# Patient Record
Sex: FEMALE | Race: WHITE | NOT HISPANIC OR LATINO | Employment: OTHER | URBAN - METROPOLITAN AREA
[De-identification: names, ages, dates, MRNs, and addresses within clinical notes are randomized per-mention and may not be internally consistent; named-entity substitution may affect disease eponyms.]

---

## 2019-08-03 ENCOUNTER — LAB (OUTPATIENT)
Dept: LAB | Facility: HOSPITAL | Age: 84
End: 2019-08-03

## 2019-08-03 ENCOUNTER — TRANSCRIBE ORDERS (OUTPATIENT)
Dept: ADMINISTRATIVE | Facility: HOSPITAL | Age: 84
End: 2019-08-03

## 2019-08-03 DIAGNOSIS — I66.9 CEREBRAL ARTERY OCCLUSION: ICD-10-CM

## 2019-08-03 DIAGNOSIS — E78.81 LIPOID DERMATOARTHRITIS: ICD-10-CM

## 2019-08-03 DIAGNOSIS — E78.81 LIPOID DERMATOARTHRITIS: Primary | ICD-10-CM

## 2019-08-03 DIAGNOSIS — F34.1 DYSTHYMIC DISORDER: ICD-10-CM

## 2019-08-03 LAB
ALBUMIN SERPL-MCNC: 3.6 G/DL (ref 3.5–4.8)
ALBUMIN UR-MCNC: 7 MG/L
ALBUMIN/GLOB SERPL: 1.3 G/DL (ref 1–1.7)
ALP SERPL-CCNC: 51 U/L (ref 32–91)
ALT SERPL W P-5'-P-CCNC: 15 U/L (ref 14–54)
ANION GAP SERPL CALCULATED.3IONS-SCNC: 14.1 MMOL/L (ref 5–15)
ARTICHOKE IGE QN: 73 MG/DL (ref 0–100)
AST SERPL-CCNC: 23 U/L (ref 15–41)
BILIRUB SERPL-MCNC: 1.3 MG/DL (ref 0.3–1.2)
BUN BLD-MCNC: 11 MG/DL (ref 8–20)
BUN/CREAT SERPL: 11 (ref 5.4–26.2)
CALCIUM SPEC-SCNC: 9.1 MG/DL (ref 8.9–10.3)
CHLORIDE SERPL-SCNC: 104 MMOL/L (ref 101–111)
CHOLEST SERPL-MCNC: 149 MG/DL
CO2 SERPL-SCNC: 28 MMOL/L (ref 22–32)
CREAT BLD-MCNC: 1 MG/DL (ref 0.4–1)
DEPRECATED RDW RBC AUTO: 44.6 FL (ref 37–54)
ERYTHROCYTE [DISTWIDTH] IN BLOOD BY AUTOMATED COUNT: 12.8 % (ref 12.3–15.4)
GFR SERPL CREATININE-BSD FRML MDRD: 53 ML/MIN/1.73
GLOBULIN UR ELPH-MCNC: 2.8 GM/DL (ref 2.5–3.8)
GLUCOSE BLD-MCNC: 117 MG/DL (ref 65–99)
HCT VFR BLD AUTO: 39.5 % (ref 34–46.6)
HDLC SERPL QL: 2.37
HDLC SERPL-MCNC: 63 MG/DL
HGB BLD-MCNC: 13.7 G/DL (ref 12–15.9)
LDLC/HDLC SERPL: 1.02 {RATIO}
MCH RBC QN AUTO: 34.4 PG (ref 26.6–33)
MCHC RBC AUTO-ENTMCNC: 34.6 G/DL (ref 31.5–35.7)
MCV RBC AUTO: 99.3 FL (ref 79–97)
PLATELET # BLD AUTO: 209 10*3/MM3 (ref 140–450)
PMV BLD AUTO: 8.3 FL (ref 6–12)
POTASSIUM BLD-SCNC: 4.1 MMOL/L (ref 3.6–5.1)
PROT SERPL-MCNC: 6.4 G/DL (ref 6.1–7.9)
RBC # BLD AUTO: 3.97 10*6/MM3 (ref 3.77–5.28)
SODIUM BLD-SCNC: 142 MMOL/L (ref 136–144)
TRIGL SERPL-MCNC: 108 MG/DL
TSH SERPL DL<=0.05 MIU/L-ACNC: 2.68 MIU/ML (ref 0.34–5.6)
VLDLC SERPL-MCNC: 21.6 MG/DL
WBC NRBC COR # BLD: 5.7 10*3/MM3 (ref 3.4–10.8)

## 2019-08-03 PROCEDURE — 80053 COMPREHEN METABOLIC PANEL: CPT

## 2019-08-03 PROCEDURE — 83036 HEMOGLOBIN GLYCOSYLATED A1C: CPT

## 2019-08-03 PROCEDURE — 80061 LIPID PANEL: CPT

## 2019-08-03 PROCEDURE — 84443 ASSAY THYROID STIM HORMONE: CPT

## 2019-08-03 PROCEDURE — 85027 COMPLETE CBC AUTOMATED: CPT

## 2019-08-03 PROCEDURE — 82043 UR ALBUMIN QUANTITATIVE: CPT

## 2019-08-03 PROCEDURE — 36415 COLL VENOUS BLD VENIPUNCTURE: CPT

## 2019-08-05 LAB — HBA1C MFR BLD: 5.4 % (ref 3.5–5.6)

## 2019-11-17 ENCOUNTER — HOSPITAL ENCOUNTER (OUTPATIENT)
Facility: HOSPITAL | Age: 84
Setting detail: OBSERVATION
Discharge: HOME | End: 2019-11-18
Attending: EMERGENCY MEDICINE | Admitting: FAMILY MEDICINE

## 2019-11-17 ENCOUNTER — APPOINTMENT (OUTPATIENT)
Dept: CT IMAGING | Facility: HOSPITAL | Age: 84
End: 2019-11-17

## 2019-11-17 DIAGNOSIS — R41.0 CONFUSION: Primary | ICD-10-CM

## 2019-11-17 LAB
ALBUMIN SERPL-MCNC: 4.2 G/DL (ref 3.5–5.2)
ALBUMIN/GLOB SERPL: 1.4 G/DL
ALP SERPL-CCNC: 68 U/L (ref 39–117)
ALT SERPL W P-5'-P-CCNC: 13 U/L (ref 1–33)
ANION GAP SERPL CALCULATED.3IONS-SCNC: 13 MMOL/L (ref 5–15)
AST SERPL-CCNC: 20 U/L (ref 1–32)
BACTERIA UR QL AUTO: ABNORMAL /HPF
BASOPHILS # BLD AUTO: 0.1 10*3/MM3 (ref 0–0.2)
BASOPHILS NFR BLD AUTO: 1.4 % (ref 0–1.5)
BILIRUB SERPL-MCNC: 0.8 MG/DL (ref 0.2–1.2)
BILIRUB UR QL STRIP: ABNORMAL
BUN BLD-MCNC: 14 MG/DL (ref 8–23)
BUN/CREAT SERPL: 13.6 (ref 7–25)
CALCIUM SPEC-SCNC: 9.3 MG/DL (ref 8.6–10.5)
CHLORIDE SERPL-SCNC: 102 MMOL/L (ref 98–107)
CLARITY UR: ABNORMAL
CO2 SERPL-SCNC: 28 MMOL/L (ref 22–29)
COLOR UR: ABNORMAL
CREAT BLD-MCNC: 1.03 MG/DL (ref 0.57–1)
DEPRECATED RDW RBC AUTO: 43.8 FL (ref 37–54)
EOSINOPHIL # BLD AUTO: 0.1 10*3/MM3 (ref 0–0.4)
EOSINOPHIL NFR BLD AUTO: 2 % (ref 0.3–6.2)
ERYTHROCYTE [DISTWIDTH] IN BLOOD BY AUTOMATED COUNT: 12.7 % (ref 12.3–15.4)
GFR SERPL CREATININE-BSD FRML MDRD: 51 ML/MIN/1.73
GLOBULIN UR ELPH-MCNC: 2.9 GM/DL
GLUCOSE BLD-MCNC: 105 MG/DL (ref 65–99)
GLUCOSE BLDC GLUCOMTR-MCNC: 102 MG/DL (ref 70–105)
GLUCOSE UR STRIP-MCNC: NEGATIVE MG/DL
HCT VFR BLD AUTO: 42.2 % (ref 34–46.6)
HGB BLD-MCNC: 14.8 G/DL (ref 12–15.9)
HGB UR QL STRIP.AUTO: NEGATIVE
HOLD SPECIMEN: NORMAL
HOLD SPECIMEN: NORMAL
HYALINE CASTS UR QL AUTO: ABNORMAL /LPF
KETONES UR QL STRIP: ABNORMAL
LEUKOCYTE ESTERASE UR QL STRIP.AUTO: ABNORMAL
LYMPHOCYTES # BLD AUTO: 1.2 10*3/MM3 (ref 0.7–3.1)
LYMPHOCYTES NFR BLD AUTO: 20 % (ref 19.6–45.3)
MCH RBC QN AUTO: 34.3 PG (ref 26.6–33)
MCHC RBC AUTO-ENTMCNC: 35 G/DL (ref 31.5–35.7)
MCV RBC AUTO: 98 FL (ref 79–97)
MONOCYTES # BLD AUTO: 0.7 10*3/MM3 (ref 0.1–0.9)
MONOCYTES NFR BLD AUTO: 11.6 % (ref 5–12)
MUCOUS THREADS URNS QL MICRO: ABNORMAL /HPF
NEUTROPHILS # BLD AUTO: 3.8 10*3/MM3 (ref 1.7–7)
NEUTROPHILS NFR BLD AUTO: 65 % (ref 42.7–76)
NITRITE UR QL STRIP: NEGATIVE
NRBC BLD AUTO-RTO: 0.2 /100 WBC (ref 0–0.2)
PH UR STRIP.AUTO: 5.5 [PH] (ref 5–8)
PLATELET # BLD AUTO: 214 10*3/MM3 (ref 140–450)
PMV BLD AUTO: 9.1 FL (ref 6–12)
POTASSIUM BLD-SCNC: 3.7 MMOL/L (ref 3.5–5.2)
PROT SERPL-MCNC: 7.1 G/DL (ref 6–8.5)
PROT UR QL STRIP: ABNORMAL
RBC # BLD AUTO: 4.3 10*6/MM3 (ref 3.77–5.28)
RBC # UR: ABNORMAL /HPF
REF LAB TEST METHOD: ABNORMAL
SODIUM BLD-SCNC: 143 MMOL/L (ref 136–145)
SP GR UR STRIP: 1.03 (ref 1–1.03)
SQUAMOUS #/AREA URNS HPF: ABNORMAL /HPF
TROPONIN T SERPL-MCNC: <0.01 NG/ML (ref 0–0.03)
UROBILINOGEN UR QL STRIP: ABNORMAL
WBC NRBC COR # BLD: 5.8 10*3/MM3 (ref 3.4–10.8)
WBC UR QL AUTO: ABNORMAL /HPF
WHOLE BLOOD HOLD SPECIMEN: NORMAL

## 2019-11-17 PROCEDURE — 99285 EMERGENCY DEPT VISIT HI MDM: CPT

## 2019-11-17 PROCEDURE — 80053 COMPREHEN METABOLIC PANEL: CPT | Performed by: EMERGENCY MEDICINE

## 2019-11-17 PROCEDURE — G0378 HOSPITAL OBSERVATION PER HR: HCPCS

## 2019-11-17 PROCEDURE — 70450 CT HEAD/BRAIN W/O DYE: CPT

## 2019-11-17 PROCEDURE — 84484 ASSAY OF TROPONIN QUANT: CPT | Performed by: EMERGENCY MEDICINE

## 2019-11-17 PROCEDURE — 81001 URINALYSIS AUTO W/SCOPE: CPT | Performed by: EMERGENCY MEDICINE

## 2019-11-17 PROCEDURE — 82962 GLUCOSE BLOOD TEST: CPT

## 2019-11-17 PROCEDURE — 93005 ELECTROCARDIOGRAM TRACING: CPT | Performed by: EMERGENCY MEDICINE

## 2019-11-17 PROCEDURE — 85025 COMPLETE CBC W/AUTO DIFF WBC: CPT | Performed by: EMERGENCY MEDICINE

## 2019-11-17 PROCEDURE — 87086 URINE CULTURE/COLONY COUNT: CPT | Performed by: EMERGENCY MEDICINE

## 2019-11-17 RX ORDER — DULOXETIN HYDROCHLORIDE 30 MG/1
60 CAPSULE, DELAYED RELEASE ORAL EVERY 12 HOURS SCHEDULED
Status: DISCONTINUED | OUTPATIENT
Start: 2019-11-17 | End: 2019-11-18

## 2019-11-17 RX ORDER — SODIUM CHLORIDE 0.9 % (FLUSH) 0.9 %
10 SYRINGE (ML) INJECTION AS NEEDED
Status: DISCONTINUED | OUTPATIENT
Start: 2019-11-17 | End: 2019-11-18

## 2019-11-17 RX ORDER — DULOXETIN HYDROCHLORIDE 60 MG/1
60 CAPSULE, DELAYED RELEASE ORAL 2 TIMES DAILY
COMMUNITY
End: 2019-11-18 | Stop reason: HOSPADM

## 2019-11-17 RX ORDER — RISPERIDONE 0.5 MG/1
0.5 TABLET, ORALLY DISINTEGRATING ORAL ONCE
Status: COMPLETED | OUTPATIENT
Start: 2019-11-17 | End: 2019-11-17

## 2019-11-17 RX ORDER — SIMVASTATIN 80 MG
80 TABLET ORAL NIGHTLY
COMMUNITY
End: 2019-11-18 | Stop reason: HOSPADM

## 2019-11-17 RX ORDER — VALSARTAN 160 MG/1
160 TABLET ORAL DAILY
COMMUNITY
End: 2021-01-01

## 2019-11-17 RX ORDER — ALPRAZOLAM 0.25 MG/1
0.25 TABLET ORAL ONCE AS NEEDED
Status: DISCONTINUED | OUTPATIENT
Start: 2019-11-17 | End: 2019-11-18

## 2019-11-17 RX ORDER — ACETAMINOPHEN 325 MG/1
650 TABLET ORAL EVERY 4 HOURS PRN
Status: DISCONTINUED | OUTPATIENT
Start: 2019-11-17 | End: 2019-11-18 | Stop reason: HOSPADM

## 2019-11-17 RX ORDER — MIRTAZAPINE 15 MG/1
7.5 TABLET, FILM COATED ORAL ONCE
Status: COMPLETED | OUTPATIENT
Start: 2019-11-17 | End: 2019-11-17

## 2019-11-17 RX ORDER — AMLODIPINE BESYLATE 5 MG/1
5 TABLET ORAL EVERY 12 HOURS PRN
Status: DISCONTINUED | OUTPATIENT
Start: 2019-11-17 | End: 2019-11-18

## 2019-11-17 RX ORDER — OMEPRAZOLE 20 MG/1
20 CAPSULE, DELAYED RELEASE ORAL DAILY
COMMUNITY
End: 2021-01-01

## 2019-11-17 RX ADMIN — AMLODIPINE BESYLATE 5 MG: 5 TABLET ORAL at 20:03

## 2019-11-17 RX ADMIN — RISPERIDONE 0.5 MG: 0.5 TABLET, ORALLY DISINTEGRATING ORAL at 22:49

## 2019-11-17 RX ADMIN — MIRTAZAPINE 7.5 MG: 15 TABLET, FILM COATED ORAL at 22:00

## 2019-11-17 RX ADMIN — DULOXETINE HYDROCHLORIDE 60 MG: 30 CAPSULE, DELAYED RELEASE ORAL at 20:04

## 2019-11-17 RX ADMIN — ACETAMINOPHEN 650 MG: 325 TABLET ORAL at 20:04

## 2019-11-17 NOTE — ED PROVIDER NOTES
"Subjective   84-year-old female presents with confusion.   states patient has had some mild confusion for some time but today it was worse than usual.  He states she woke up around 4 AM and was starting to get ready in the dark.  He got her back into bed but then when they got up this morning she was still acting confused.  She seemed to not know which room of the house she was in at times.  Patient has no complaints.  She denies any numbness, weakness, tingling.  She has had no chest pain or shortness of breath.  She denies any alleviating or exacerbating factors.        History provided by:  Patient and spouse      Review of Systems   Constitutional: Negative for fatigue and fever.   HENT: Negative for congestion and sore throat.    Eyes: Negative for pain and redness.   Respiratory: Negative for cough and shortness of breath.    Cardiovascular: Negative for chest pain and palpitations.   Gastrointestinal: Negative for abdominal pain and vomiting.   Genitourinary: Negative for dysuria and frequency.   Musculoskeletal: Negative for back pain.   Skin: Negative for rash.   Neurological: Negative for weakness and numbness.   Psychiatric/Behavioral: Positive for confusion. Negative for behavioral problems.       Past Medical History:   Diagnosis Date   • Stroke (CMS/Formerly [facility])        No Known Allergies    History reviewed. No pertinent surgical history.    History reviewed. No pertinent family history.    Social History     Socioeconomic History   • Marital status:      Spouse name: Not on file   • Number of children: Not on file   • Years of education: Not on file   • Highest education level: Not on file       /61 (BP Location: Left arm, Patient Position: Lying)   Pulse 82   Temp 98 °F (36.7 °C) (Oral)   Resp 26   Ht 165.1 cm (65\")   Wt 58.1 kg (128 lb 1.4 oz)   SpO2 97%   BMI 21.31 kg/m²       Objective   Physical Exam   Constitutional: She appears well-developed and well-nourished.   HENT: "   Head: Normocephalic and atraumatic.   Eyes: EOM are normal. Pupils are equal, round, and reactive to light.   Neck: Normal range of motion. Neck supple.   Cardiovascular: Normal rate, regular rhythm and normal heart sounds.   Pulmonary/Chest: Effort normal and breath sounds normal. No respiratory distress.   Abdominal: Soft. Bowel sounds are normal. There is no tenderness.   Musculoskeletal: Normal range of motion. She exhibits no edema.   Neurological: She is alert. She has normal strength. No cranial nerve deficit or sensory deficit.   Oriented to person and place but not time, somewhat confused in conversation   Skin: Skin is warm and dry.       Procedures           ED Course      Mitral rotation of EKG shows sinus rhythm, rate of 77, right bundle branch block, no ST elevation.    Results for orders placed or performed during the hospital encounter of 11/17/19   Comprehensive Metabolic Panel   Result Value Ref Range    Glucose 105 (H) 65 - 99 mg/dL    BUN 14 8 - 23 mg/dL    Creatinine 1.03 (H) 0.57 - 1.00 mg/dL    Sodium 143 136 - 145 mmol/L    Potassium 3.7 3.5 - 5.2 mmol/L    Chloride 102 98 - 107 mmol/L    CO2 28.0 22.0 - 29.0 mmol/L    Calcium 9.3 8.6 - 10.5 mg/dL    Total Protein 7.1 6.0 - 8.5 g/dL    Albumin 4.20 3.50 - 5.20 g/dL    ALT (SGPT) 13 1 - 33 U/L    AST (SGOT) 20 1 - 32 U/L    Alkaline Phosphatase 68 39 - 117 U/L    Total Bilirubin 0.8 0.2 - 1.2 mg/dL    eGFR Non African Amer 51 (L) >60 mL/min/1.73    Globulin 2.9 gm/dL    A/G Ratio 1.4 g/dL    BUN/Creatinine Ratio 13.6 7.0 - 25.0    Anion Gap 13.0 5.0 - 15.0 mmol/L   Troponin   Result Value Ref Range    Troponin T <0.010 0.000 - 0.030 ng/mL   Urinalysis With Culture If Indicated - Urine, Catheter In/Out   Result Value Ref Range    Color, UA Dark Yellow (A) Yellow, Straw    Appearance, UA Hazy (A) Clear    pH, UA 5.5 5.0 - 8.0    Specific Gravity, UA 1.029 1.005 - 1.030    Glucose, UA Negative Negative    Ketones, UA Trace (A) Negative     Bilirubin, UA Small (1+) (A) Negative    Blood, UA Negative Negative    Protein, UA 30 mg/dL (1+) (A) Negative    Leuk Esterase, UA Trace (A) Negative    Nitrite, UA Negative Negative    Urobilinogen, UA 1.0 E.U./dL 0.2 - 1.0 E.U./dL   CBC Auto Differential   Result Value Ref Range    WBC 5.80 3.40 - 10.80 10*3/mm3    RBC 4.30 3.77 - 5.28 10*6/mm3    Hemoglobin 14.8 12.0 - 15.9 g/dL    Hematocrit 42.2 34.0 - 46.6 %    MCV 98.0 (H) 79.0 - 97.0 fL    MCH 34.3 (H) 26.6 - 33.0 pg    MCHC 35.0 31.5 - 35.7 g/dL    RDW 12.7 12.3 - 15.4 %    RDW-SD 43.8 37.0 - 54.0 fl    MPV 9.1 6.0 - 12.0 fL    Platelets 214 140 - 450 10*3/mm3    Neutrophil % 65.0 42.7 - 76.0 %    Lymphocyte % 20.0 19.6 - 45.3 %    Monocyte % 11.6 5.0 - 12.0 %    Eosinophil % 2.0 0.3 - 6.2 %    Basophil % 1.4 0.0 - 1.5 %    Neutrophils, Absolute 3.80 1.70 - 7.00 10*3/mm3    Lymphocytes, Absolute 1.20 0.70 - 3.10 10*3/mm3    Monocytes, Absolute 0.70 0.10 - 0.90 10*3/mm3    Eosinophils, Absolute 0.10 0.00 - 0.40 10*3/mm3    Basophils, Absolute 0.10 0.00 - 0.20 10*3/mm3    nRBC 0.2 0.0 - 0.2 /100 WBC   Urinalysis, Microscopic Only - Urine, Catheter In/Out   Result Value Ref Range    RBC, UA 3-5 (A) None Seen /HPF    WBC, UA 6-12 (A) None Seen /HPF    Bacteria, UA None Seen None Seen /HPF    Squamous Epithelial Cells, UA 7-12 (A) None Seen, 0-2 /HPF    Hyaline Casts, UA 13-20 None Seen /LPF    Mucus, UA Trace None Seen, Trace /HPF    Methodology Manual Light Microscopy    POC Glucose Once   Result Value Ref Range    Glucose 102 70 - 105 mg/dL   Light Blue Top   Result Value Ref Range    Extra Tube hold for add-on    Gold Top - SST   Result Value Ref Range    Extra Tube Hold for add-ons.    Green Top (Gel)   Result Value Ref Range    Extra Tube Hold for add-ons.      Ct Head Without Contrast    Result Date: 11/17/2019  1 no acute intracranial finding 2. Old area of infarct involving the right occipital lobe over an area of approximately 6 x 3 cm with associated  encephalomalacia and dilatation of the occipital horn on the right. 3. Old lacunar infarcts as described above with global cortical atrophy 4. Bone windows demonstrate no evidence of calvarial injury or change of sinus or mastoid disease  Electronically Signed By-Percy Mayo Jr. On:11/17/2019 12:12 PM This report was finalized on [id number] by  Percy Mayo Jr., .            MDM   Patient had the above evaluation.  Results were discussed with the patient and family.  Work-up has been fairly unremarkable.  Patient has an old right occipital stroke on CT but no acute intracranial findings.  Labs were fairly unremarkable.  Urinalysis shows trace leukocyte esterase but bacteria is negative and there are 7-12 epithelial cells.  No source of patient's confusion has been identified.  I discussed with the primary doctor and the patient will be admitted for further evaluation and management.      Final diagnoses:   Confusion              Luis Flood MD  11/17/19 3772

## 2019-11-17 NOTE — PLAN OF CARE
Problem: Patient Care Overview  Goal: Plan of Care Review  Outcome: Ongoing (interventions implemented as appropriate)   11/17/19 1823   Coping/Psychosocial   Plan of Care Reviewed With patient;spouse;family   Plan of Care Review   Progress no change   OTHER   Outcome Summary Plan of care discussed with pt and family. Waiting for Dr. pedersen.     Goal: Interprofessional Rounds/Family Conf  Outcome: Ongoing (interventions implemented as appropriate)   11/17/19 1823   Interdisciplinary Rounds/Family Conf   Participants nursing       Problem: Stroke (Ischemic) (Adult)  Goal: Signs and Symptoms of Listed Potential Problems Will be Absent, Minimized or Managed (Stroke)  Outcome: Ongoing (interventions implemented as appropriate)   11/17/19 1823   Goal/Outcome Evaluation   Problems Assessed (Stroke (Ischemic)) all   Problems Assessed (Stroke (Ischemic)) none       Problem: Fall Risk (Adult)  Goal: Identify Related Risk Factors and Signs and Symptoms  Outcome: Ongoing (interventions implemented as appropriate)   11/17/19 1823   Fall Risk (Adult)   Related Risk Factors (Fall Risk) age-related changes;confusion/agitation;impaired vision;environment unfamiliar   Signs and Symptoms (Fall Risk) presence of risk factors     Goal: Absence of Fall  Outcome: Ongoing (interventions implemented as appropriate)   11/17/19 1823   Fall Risk (Adult)   Absence of Fall making progress toward outcome

## 2019-11-18 VITALS
OXYGEN SATURATION: 95 % | SYSTOLIC BLOOD PRESSURE: 152 MMHG | TEMPERATURE: 97.5 F | RESPIRATION RATE: 16 BRPM | DIASTOLIC BLOOD PRESSURE: 44 MMHG | HEIGHT: 65 IN | WEIGHT: 130.95 LBS | HEART RATE: 59 BPM | BODY MASS INDEX: 21.82 KG/M2

## 2019-11-18 PROCEDURE — 97116 GAIT TRAINING THERAPY: CPT

## 2019-11-18 PROCEDURE — 96125 COGNITIVE TEST BY HC PRO: CPT

## 2019-11-18 PROCEDURE — 97530 THERAPEUTIC ACTIVITIES: CPT

## 2019-11-18 PROCEDURE — 92610 EVALUATE SWALLOWING FUNCTION: CPT

## 2019-11-18 PROCEDURE — 97162 PT EVAL MOD COMPLEX 30 MIN: CPT

## 2019-11-18 PROCEDURE — G0378 HOSPITAL OBSERVATION PER HR: HCPCS

## 2019-11-18 RX ORDER — RISPERIDONE 0.5 MG/1
0.5 TABLET ORAL NIGHTLY
Qty: 90 TABLET | Refills: 0 | Status: SHIPPED | OUTPATIENT
Start: 2019-11-18 | End: 2021-01-01

## 2019-11-18 RX ORDER — LOSARTAN POTASSIUM 50 MG/1
50 TABLET ORAL
Status: DISCONTINUED | OUTPATIENT
Start: 2019-11-18 | End: 2019-11-18

## 2019-11-18 RX ORDER — RISPERIDONE 0.25 MG/1
0.5 TABLET ORAL NIGHTLY
Status: DISCONTINUED | OUTPATIENT
Start: 2019-11-18 | End: 2019-11-18 | Stop reason: HOSPADM

## 2019-11-18 NOTE — THERAPY EVALUATION
"Acute Care - Speech Language Pathology   Swallow Initial Evaluation  [facility]     Patient Name: Doris Brenner  : 1935  MRN: [id number]  Today's Date: 2019               Admit Date: 2019    Visit Dx:     ICD-10-CM ICD-9-CM   1. Confusion R41.0 298.9     Patient Active Problem List   Diagnosis   • Confusion     Past Medical History:   Diagnosis Date   • Hypertension    • Stroke (CMS/HCC)      Past Surgical History:   Procedure Laterality Date   • HYSTERECTOMY          SWALLOW EVALUATION (last 72 hours)      SLP Adult Swallow Evaluation     Row Name 19 1100                   Rehab Evaluation    Document Type  evaluation  -VR        Subjective Information  no complaints  -VR        Patient Observations  alert  -VR        Patient Effort  adequate  -VR           General Information    Patient Profile Reviewed  yes  -VR        Pertinent History Of Current Problem  MD orders received this date for Speech consult d/t \"pt failing swallow screen\".  When SLP checked Nsg screen documentation, Nsg reported that pt passed swallow screen.   -VR        Current Method of Nutrition  regular textures;thin liquids  -VR        Prior Level of Function-Swallowing  no diet consistency restrictions;regular textures;thin liquids  -VR        Patient's Goals for Discharge  return home  -VR           Oral Motor and Function    Dentition Assessment  natural, present and adequate  -VR        Volitional Cough  WFL  -VR           Oral Musculature and Cranial Nerve Assessment    Oral Motor General Assessment  WFL  -VR        Oral Motor, Comment  Unable to view palatal elevation due to tongue obstrucitng view.  -VR           General Eating/Swallowing Observations    Eating/Swallowing Skills  self-fed  -VR        Positioning During Eating  upright 90 degree  -VR        Utensils Used  spoon;cup;straw  -VR        Consistencies Trialed  regular textures;soft textures;thin liquids  -VR           Clinical Swallow Eval    Oral " Prep Phase  WFL  -VR        Oral Transit  WFL  -VR        Pharyngeal Phase  WFL  -VR        Esophageal Phase  suspected esophageal impairment Pt c/o heartburn w/orange juice.  -VR        Clinical Swallow Evaluation Summary  A clinical bedside swallow eval. was completed this date.  Mastication and A-P transit were WFL.  Swallow responsiveness and laryngeal elevation were WFL as judged via digital palpation of the neck. Pt exhibited occasional throat clearing, however does report h/o esophageal reflux.  Nsg checked lungs after completion of meal and reported that lungs are CTA but dminished. No record in Epic of a chest X-ray being completed since this admission. Rec cont. regular and thin liq diet consistencies, reflux precautions.  Pt/family were educated re results and recs.  -VR           Esophageal Phase Concerns    Esophageal Phase Concerns  other (see comments) c/o heartburn  -VR           Clinical Impression    SLP Swallowing Diagnosis  functional oral phase;functional pharyngeal phase  -VR           Recommendations    Therapy Frequency (Swallow)  evaluation only  -VR        SLP Diet Recommendation  regular textures;thin liquids  -VR        Recommended Precautions and Strategies  upright posture during/after eating;small bites of food and sips of liquid  -VR        SLP Rec. for Method of Medication Administration  meds whole;with thin liquids  -VR        Monitor for Signs of Aspiration  yes;notify SLP if any concerns  -VR        Anticipated Dischage Disposition  home with assist  -VR          User Key  (r) = Recorded By, (t) = Taken By, (c) = Cosigned By    Initials Name Effective Dates    Kim Robledo SLP 06/17/19 -           EDUCATION  The patient has been educated in the following areas:   Dysphagia (Swallowing Impairment).    SLP Recommendation and Plan  SLP Swallowing Diagnosis: functional oral phase, functional pharyngeal phase  SLP Diet Recommendation: regular textures, thin  liquids  Recommended Precautions and Strategies: upright posture during/after eating, small bites of food and sips of liquid  SLP Rec. for Method of Medication Administration: meds whole, with thin liquids     Monitor for Signs of Aspiration: yes, notify SLP if any concerns        Anticipated Dischage Disposition: home with assist     Therapy Frequency (Swallow): evaluation only                      Time Calculation:       Therapy Charges for Today     Code Description Service Date Service Provider Modifiers Qty    [id number]  ST EVAL ORAL PHARYNG SWALLOW 3 11/18/2019 Kim Navarro, CARRIE GN 1               CARRIE Wilson  11/18/2019

## 2019-11-18 NOTE — PROGRESS NOTES
Case Management Discharge Note    Final Note: Routine d/c to home -  to make a referral to [facility]               Final Discharge Disposition Code: 01 - home or self-care

## 2019-11-18 NOTE — PROGRESS NOTES
Discharge Planning Assessment   [facility]     Patient Name: Doris Brenner  MRN: [id number]  Today's Date: 11/18/2019    Admit Date: 11/17/2019    Discharge Needs Assessment     Row Name 11/18/19 1743       Living Environment    Lives With  spouse    Current Living Arrangements  home/apartment/condo    Primary Care Provided by  self;spouse/significant other    Able to Return to Prior Arrangements  yes       Resource/Environmental Concerns    Resource/Environmental Concerns  none    Transportation Concerns  car, none       Transition Planning    Patient/Family Anticipates Transition to  home with family    Patient/Family Anticipated Services at Transition  none    Transportation Anticipated  family or friend will provide       Discharge Needs Assessment    Concerns to be Addressed  -- SW to make a referral to [facility] services    Equipment Currently Used at Home  none        Discharge Plan     Row Name 11/18/19 1745       Plan    Plan  Routine d/c to home              Expected Discharge Date and Time     Expected Discharge Date Expected Discharge Time    Nov 18, 2019         Demographic Summary     Row Name 11/18/19 1742       General Information    Admission Type  observation    Referral Source  admission list    Reason for Consult  discharge planning    Preferred Language  English        Functional Status     Row Name 11/18/19 1743       Functional Status, IADL    Medications  assistive person    Meal Preparation  assistive person    Housekeeping  assistive person    Laundry  assistive person    Shopping  assistive person        Tracey Ta RN, CM  Office Phone [phone]  Cell [phone]

## 2019-11-18 NOTE — PLAN OF CARE
Problem: Patient Care Overview  Goal: Plan of Care Review  Outcome: Ongoing (interventions implemented as appropriate)   11/18/19 1065   Coping/Psychosocial   Plan of Care Reviewed With patient;spouse;daughter   Plan of Care Review   Progress no change   OTHER   Outcome Summary Pt presents with AMS/confusion, disoriented to month/year, place, and situation and with confusion throughout session, demonstrating poor safety/deficit awareness. Pt also demonstrates poor standing balance as evidenced by low scores on SLS (1-2 seconds) and MCTSIB position I and II (15 seconds, 4 seconds, respectively) and demonstrates lateral veering with sciossored gait pattern during gait bout 60ft, requiring min A for LOB corrections. Recommending IP rehab at d/c to address cognitive/mobility deficits, as pt is not safe for home (often alone during day) and at high risk for falls. Pt arrived to ED in high heels and  reports she frequently wears heels, educated to refrain from wearing heels secondary to balance deficits. Pt also has 12-14 steps to access home. Plan to see 3x/week at [facility] for progression of mobility.

## 2019-11-18 NOTE — THERAPY EVALUATION
Acute Care - Speech Language Pathology Initial Evaluation   [facility]     Patient Name: Doris Brenner  : 1935  MRN: [id number]  Today's Date: 2019               Admit Date: 2019     Visit Dx:    ICD-10-CM ICD-9-CM   1. Confusion R41.0 298.9     Patient Active Problem List   Diagnosis   • Confusion     Past Medical History:   Diagnosis Date   • Hypertension    • Stroke (CMS/HCC)      Past Surgical History:   Procedure Laterality Date   • HYSTERECTOMY          SLP EVALUATION (last 72 hours)      SLP SLC Evaluation     Row Name 19 1200                   Communication Assessment/Intervention    Document Type  evaluation  -VR        Subjective Information  no complaints  -VR        Patient Observations  alert  -VR        Patient Effort  fair  -VR           General Information    Patient Profile Reviewed  yes  -VR        Pertinent History Of Current Problem  MD orders received to inderjit. communication skills. Pt reportedly was admitted to the hospital w/increased confusion. Pt has a h/o dementia and reportedly has been forgetting to take her meds which the spouse thought may have contributed to the increased confusion.   -VR        Patient Level of Education  Graduated from High School  -VR        Prior Level of Function-Communication  cognitive-linguistic impairment;other (see comments) Spouse was managing finances, pt managed meds  -VR        Plans/Goals Discussed with  patient;family  -VR        Patient's Goals for Discharge  return to home  -VR        Family Goals for Discharge  ability to leave patient alone for short periods in the home;other (see comments) Pt was left alone while spouse worked during the day.  -VR        Standardized Assessment Used  SLUMS  -VR           Motor Speech Assessment/Intervention    Motor Speech, Comment  WFL for speech intelligibility  -VR           Cognitive Assessment Intervention- SLP    Cognitive Function (Cognition)  severe impairment  -VR         Orientation Status (Cognition)  severe impairment  -VR        Memory (Cognitive)  short-term;immediate;severe impairment  -VR        Thought Organization (Cognitive)  concrete divergent;severe impairment  -VR           Standardized Tests    Cognitive/Memory Tests  SLUMS: [facility] Mental Status Examination  -VR           SLUMS: [facility] Mental Status Examination    SLUMS Score  7  -VR        SLUMS Range  1-20: Dementia (High school education or higher)  -VR           SLP Clinical Impressions    SLP Diagnosis  Severe Cognitive Impairment  -VR        Rehab Potential/Prognosis  fair  -VR        SLC Criteria for Skilled Therapy Interventions Met  yes  -VR           Recommendations    Therapy Frequency (SLP SLC)  2 days per week  -VR        Anticipated Dischage Disposition  home with assist Rec 24 hr S and assist w/meds to pt and family.  -VR        Demonstrates Need for Referral to Another Service  other (see comments) Rec  ST for cognitive skills for return to baseline.   -VR           Communication Treatment Objective and Progress Goals (SLP)    Cognitive Linguistic Treatment Objectives  Cognitive Linguistic Treatment Objectives (Group)  -VR           Cognitive Linguistic Treatment Objectives    Orientation Selection  orientation, SLP goal 1  -VR        Memory Skills Selection  memory skills, SLP goal 1  -VR        Organizational Skills Selection  organizational skills, SLP goal 1  -VR           Orientation Goal 1 (SLP)    Improve Orientation Through Goal 1 (SLP)  demonstrating orientation to day;demonstrating orientation to month;demonstrating orientation to year;demonstrating orientation to place;60%;with minimal cues (75-90%)  -VR           Memory Skills Goal 1 (SLP)    Improve Memory Skills Through Goal 1 (SLP)  recalling related word lists with an imposed delay;60%;with minimal cues (75-90%)  -VR        Time Frame (Memory Skills Goal 1, SLP)  by discharge  -VR           Organizational  Skills Goal 1 (SLP)    Improve Thought Organization Through Goal 1 (SLP)  completing a divergent naming task;60%;with minimal cues (75-90%)  -VR        Time Frame (Thought Organization Skills Goal 1, SLP)  by discharge  -VR          User Key  (r) = Recorded By, (t) = Taken By, (c) = Cosigned By    Initials Name Effective Dates    VR Kim Navarro SLP 06/17/19 -              EDUCATION  The patient has been educated in the following areas:     Cognitive Impairment.    SLP Recommendation and Plan  SLP Diagnosis: Severe Cognitive Impairment     SLC Criteria for Skilled Therapy Interventions Met: yes  Anticipated Dischage Disposition: home with assist(Rec 24 hr S and assist w/meds to pt and family.)  Therapy Frequency (Swallow): evaluation only              SLP GOALS     Row Name 11/18/19 1200             Orientation Goal 1 (SLP)    Improve Orientation Through Goal 1 (SLP)  demonstrating orientation to day;demonstrating orientation to month;demonstrating orientation to year;demonstrating orientation to place;60%;with minimal cues (75-90%)  -VR         Memory Skills Goal 1 (SLP)    Improve Memory Skills Through Goal 1 (SLP)  recalling related word lists with an imposed delay;60%;with minimal cues (75-90%)  -VR      Time Frame (Memory Skills Goal 1, SLP)  by discharge  -VR         Organizational Skills Goal 1 (SLP)    Improve Thought Organization Through Goal 1 (SLP)  completing a divergent naming task;60%;with minimal cues (75-90%)  -VR      Time Frame (Thought Organization Skills Goal 1, SLP)  by discharge  -VR        User Key  (r) = Recorded By, (t) = Taken By, (c) = Cosigned By    Initials Name Provider Type    VR Kim Navarro SLP Speech and Language Pathologist                  Time Calculation:         Therapy Charges for Today     Code Description Service Date Service Provider Modifiers Qty    02968094746 HC ST EVAL ORAL PHARYNG SWALLOW 3 11/18/2019 Kim Navarro, SLP GN 1    50180300674 HC ST STD COG  PERF TEST PER HOUR 11/18/2019 Kim Navarro, SLP GN 1                     Kim Navarro, SLP  11/18/2019

## 2019-11-18 NOTE — H&P
DATE/TIME OF ADMISSION:  11/17/2019 10:21 AM  Patient Care Team:  Florecita Chavez MD as PCP - General (Family Medicine)    Chief complaint WANTS TO LEAVE THE HOSPITAL  WAS BROUGHT IN BY HER  FOR INCREASED CONFUSION FROM HER BASELINE  SIGNIFICANT HISTORY IS THAT OF 2 GLASSES OF WINE DAILY AND THE DRINKING OF MORE THAN ONE LIMARITA  (MARGARITAS) PER . HE FEELS THAT SHE DRINKS TOO MUCH. WHEN ASKED HOW SHE GETS THE ALCOHOL, HE BUYS IT AND BRINGS IT TO THE HOME. POOR INSIGHT IN THIS BEING A SIGNIFICANT ISSUE WITH DEMENTIA AND HER MEDS.   NOT TAKING HER MEDS CORRECTLY  SHE IS A NEW PATIENT TO ME    Subjective     Patient is a 84 y.o. female presents with TRANSIENTLY INCREASED (OVER HER BASELINE) CONFUSION. . Onset of symptoms was AT HOME AND  WAS CONCERNED. PMH OF STROKE NOTED.   SHE WAS QUITE AGGITATED LAST PM WHEN SHE HAD TO STAY IN THE HOSPITAL AND BP WAS VERY HIGH THEN TOO. HISTORY OF HTN..      Review of Systems       History  Past Medical History:   Diagnosis Date   • Hypertension    • Stroke (CMS/HCC)      Past Surgical History:   Procedure Laterality Date   • HYSTERECTOMY       History reviewed. No pertinent family history.  Social History     Tobacco Use   • Smoking status: Never Smoker   • Smokeless tobacco: Never Used   Substance Use Topics   • Alcohol use: Yes     Alcohol/week: 8.4 oz     Types: 14 Glasses of wine per week   • Drug use: No     Medications Prior to Admission   Medication Sig Dispense Refill Last Dose   • DULoxetine (CYMBALTA) 60 MG capsule Take 60 mg by mouth 2 (Two) Times a Day.      • omeprazole (priLOSEC) 20 MG capsule Take 20 mg by mouth Daily.      • simvastatin (ZOCOR) 80 MG tablet Take 80 mg by mouth Every Night.      • valsartan (DIOVAN) 160 MG tablet Take 160 mg by mouth Daily.        Allergies:  Patient has no known allergies.    Objective     Vital Signs  Temp:  [97.5 °F (36.4 °C)-98 °F (36.7 °C)] 97.5 °F (36.4 °C)  Heart Rate:  [59-88] 59  Resp:  [16-26]  16  BP: (102-205)/(44-75) 152/44     Physical Exam:      General Appearance:    Alert,  RESERVEDLY cooperative, in no acute distress EXCEPT ANXIOUS    Head:    Normocephalic, without obvious abnormality, atraumatic   Eyes:            Lids and lashes normal, conjunctivae and sclerae normal, no   icterus, no pallor, corneas clear, PERRLA   Ears:    Ears appear intact with no abnormalities noted   Throat:   No oral lesions, no thrush, oral mucosa moist   Neck:   No adenopathy, supple, trachea midline, no thyromegaly, no   carotid bruit, no JVD   Lungs:     Clear to auscultation,respirations regular, even and                  unlabored    Heart:    Regular rhythm and normal rate, normal S1 and S2, no            murmur, no gallop, no rub, no click   Chest Wall:    No abnormalities observed   Abdomen:     Normal bowel sounds, no masses, no organomegaly, soft        non-tender, non-distended, no guarding, no rebound                tenderness   Extremities:   Moves all extremities well, no edema, no cyanosis, no             redness   Pulses:   Pulses palpable and equal bilaterally   Skin:   No bleeding, bruising or rash   Lymph nodes:   No palpable adenopathy   Neurologic:   Cranial nerves 2 - 12 grossly intact, sensation intact, DTR       present and equal bilaterally; O X 2 AND ALERT  FOLLOWS COMMANDS        I HAVE PERSONALLY EXAMINED THE PATIENT AND HAVE REVIEWED APPROPRIATE LAB AND IMAGING RESULTS.    Imaging Results (Last 24 Hours)     Procedure Component Value Units Date/Time    CT Head Without Contrast [[id number]] Collected:  11/17/19 1209     Updated:  11/17/19 1214    Narrative:          DATE OF EXAM:  11/17/2019 11:52 AM     PROCEDURE:   CT HEAD WO CONTRAST-     INDICATIONS:   confusion patient's an 84-year-old female who presents with headache,  history of stroke, confusion     COMPARISON:  No Comparisons Available     TECHNIQUE:   Routine transaxial cuts were obtained through the head without the  administration  of contrast. Automated exposure control and iterative  reconstruction methods were used.     FINDINGS:  Today's CT consistent sequential 3 mm helical images through the brain  which fail to demonstrate change of acute CVA old area of  encephalomalacia secondary to old infarct in the right occipital lobe is  seen over an area of approximately 6 x 3 cm. There is associated  cortical atrophy old lacunar infarct in the corona radiata is noted on  the right along with small bilateral basal ganglia lacunar infarcts and  global cortical atrophy. There is no evidence of calvarial injury or  change of sinus or mastoid disease.        Impression:       1 no acute intracranial finding  2. Old area of infarct involving the right occipital lobe over an area  of approximately 6 x 3 cm with associated encephalomalacia and  dilatation of the occipital horn on the right.  3. Old lacunar infarcts as described above with global cortical atrophy  4. Bone windows demonstrate no evidence of calvarial injury or change of  sinus or mastoid disease     Electronically Signed By-Percy Mayo Jr. On:11/17/2019 12:12 PM  This report was finalized on [id number] by  Percy Mayo Jr., .           Lab Results (last 24 hours)     Procedure Component Value Units Date/Time    Extra Tubes [[id number]] Collected:  11/17/19 1126    Specimen:  Blood, Venous Line Updated:  11/17/19 1231    Narrative:       The following orders were created for panel order Extra Tubes.  Procedure                               Abnormality         Status                     ---------                               -----------         ------                     Light Blue Top[[id number]]                                   Final result               Gold Top - SST[[id number]]                                   Final result               Green Top (Gel)[[id number]]                                  Final result                 Please view results for these tests on the individual  orders.    Light Blue Top [[id number]] Collected:  11/17/19 1126    Specimen:  Blood Updated:  11/17/19 1231     Extra Tube hold for add-on     Comment: Auto resulted       Gold Top - SST [[id number]] Collected:  11/17/19 1126    Specimen:  Blood Updated:  11/17/19 1231     Extra Tube Hold for add-ons.     Comment: Auto resulted.       Green Top (Gel) [[id number]] Collected:  11/17/19 1126    Specimen:  Blood Updated:  11/17/19 1231     Extra Tube Hold for add-ons.     Comment: Auto resulted.       Comprehensive Metabolic Panel [[id number]]  (Abnormal) Collected:  11/17/19 1126    Specimen:  Blood Updated:  11/17/19 1200     Glucose 105 mg/dL      BUN 14 mg/dL      Creatinine 1.03 mg/dL      Sodium 143 mmol/L      Potassium 3.7 mmol/L      Chloride 102 mmol/L      CO2 28.0 mmol/L      Calcium 9.3 mg/dL      Total Protein 7.1 g/dL      Albumin 4.20 g/dL      ALT (SGPT) 13 U/L      AST (SGOT) 20 U/L      Alkaline Phosphatase 68 U/L      Total Bilirubin 0.8 mg/dL      eGFR Non African Amer 51 mL/min/1.73      Globulin 2.9 gm/dL      A/G Ratio 1.4 g/dL      BUN/Creatinine Ratio 13.6     Anion Gap 13.0 mmol/L     Narrative:       GFR Normal >60  Chronic Kidney Disease <60  Kidney Failure <15    Troponin [[id number]]  (Normal) Collected:  11/17/19 1126    Specimen:  Blood Updated:  11/17/19 1158     Troponin T <0.010 ng/mL     Narrative:       Troponin T Reference Range:  <= 0.03 ng/mL-   Negative for AMI  >0.03 ng/mL-     Abnormal for myocardial necrosis.  Clinicians would have to utilize clinical acumen, EKG, Troponin and serial changes to determine if it is an Acute Myocardial Infarction or myocardial injury due to an underlying chronic condition.     Urinalysis, Microscopic Only - Urine, Catheter In/Out [[id number]]  (Abnormal) Collected:  11/17/19 1125    Specimen:  Urine, Catheter In/Out Updated:  11/17/19 1149     RBC, UA 3-5 /HPF      WBC, UA 6-12 /HPF      Bacteria, UA None Seen /HPF      Squamous Epithelial Cells, UA  7-12 /HPF      Hyaline Casts, UA 13-20 /LPF      Mucus, UA Trace /HPF      Methodology Manual Light Microscopy    Urine Culture - Urine, Urine, Catheter In/Out [[id number]] Collected:  11/17/19 1125    Specimen:  Urine, Catheter In/Out Updated:  11/17/19 1149    Urinalysis With Culture If Indicated - Urine, Catheter In/Out [[id number]]  (Abnormal) Collected:  11/17/19 1125    Specimen:  Urine, Catheter In/Out Updated:  11/17/19 1142     Color, UA Dark Yellow     Appearance, UA Hazy     pH, UA 5.5     Specific Gravity, UA 1.029     Glucose, UA Negative     Ketones, UA Trace     Bilirubin, UA Small (1+)     Comment: Confirmation testing is unavailable.  A serum bilirubin is recommended for further assessment.        Blood, UA Negative     Protein, UA 30 mg/dL (1+)     Leuk Esterase, UA Trace     Nitrite, UA Negative     Urobilinogen, UA 1.0 E.U./dL    CBC & Differential [[id number]] Collected:  11/17/19 1126    Specimen:  Blood Updated:  11/17/19 1134    Narrative:       The following orders were created for panel order CBC & Differential.  Procedure                               Abnormality         Status                     ---------                               -----------         ------                     CBC Auto Differential[[id number]]        Abnormal            Final result                 Please view results for these tests on the individual orders.    CBC Auto Differential [[id number]]  (Abnormal) Collected:  11/17/19 1126    Specimen:  Blood Updated:  11/17/19 1134     WBC 5.80 10*3/mm3      RBC 4.30 10*6/mm3      Hemoglobin 14.8 g/dL      Hematocrit 42.2 %      MCV 98.0 fL      MCH 34.3 pg      MCHC 35.0 g/dL      RDW 12.7 %      RDW-SD 43.8 fl      MPV 9.1 fL      Platelets 214 10*3/mm3      Neutrophil % 65.0 %      Lymphocyte % 20.0 %      Monocyte % 11.6 %      Eosinophil % 2.0 %      Basophil % 1.4 %      Neutrophils, Absolute 3.80 10*3/mm3      Lymphocytes, Absolute 1.20 10*3/mm3      Monocytes, Absolute  0.70 10*3/mm3      Eosinophils, Absolute 0.10 10*3/mm3      Basophils, Absolute 0.10 10*3/mm3      nRBC 0.2 /100 WBC     POC Glucose Once [[id number]]  (Normal) Collected:  11/17/19 1022    Specimen:  Blood Updated:  11/17/19 1023     Glucose 102 mg/dL      Comment: Serial Number: [id number]:  [id number]              I reviewed the patient's new clinical results.    Assessment/Plan     Active Problems:    Confusion---ACUTE ON CHRONIC; RESOLVED BACK TO HER BASELINE   ETOH ABUSE---URGED THAT THE  NOT BRING IT INTO THE HOME; MARGINAL FOR SAFE LIVING IN THE HOME ; MAY NEED INTERVENTION  HX CVA---IN THE PAST AND STABLE  HYSTERECTOMY  GERD  I URGE DOING A MEDICATION KEEPER CASE TO EASE REGULAR MED ADMINISTRATION          I discussed the patients findings and my recommendations with patient and family     Florecita Chavez MD  11/18/19  8:48 AM

## 2019-11-18 NOTE — THERAPY EVALUATION
"Patient Name: Doris Brenner  : 1935    MRN: [id number]                              Today's Date: 2019       Admit Date: 2019    Visit Dx:     ICD-10-CM ICD-9-CM   1. Confusion R41.0 298.9     Patient Active Problem List   Diagnosis   • Confusion     Past Medical History:   Diagnosis Date   • Hypertension    • Stroke (CMS/HCC)      Past Surgical History:   Procedure Laterality Date   • HYSTERECTOMY       General Information     Row Name 19 1033          PT Evaluation Time/Intention    Document Type  evaluation  -AO     Mode of Treatment  physical therapy  -AO     Row Name 19 1033          General Information    Patient Profile Reviewed?  yes  -AO     Prior Level of Function  -- Previously independent with household/community mobility with no AD, still wears high heels when out in community, independent all ADLs, no longer drives;  and daughter reports pt \"falls frequently\"  -AO     Existing Precautions/Restrictions  fall Confusion  -AO     Barriers to Rehab  cognitive status  -AO     Row Name 19 1033          Relationship/Environment    Lives With  spouse Pt lives with  who is available only intermittently and is \"in and out\" for grocery shopping and working at the  home; pt is often alone for periods of time during the day  -AO     Row Name 19 1033          Home Main Entrance    Number of Stairs, Main Entrance  other (see comments) 12-14 steps from garage entrance as well as front entrance  -AO     Row Name 19 1033          Stairs Within Home, Primary    Number of Stairs, Within Home, Primary  none  -AO     Row Name 19 1033          Cognitive Assessment/Intervention- PT/OT    Orientation Status (Cognition)  disoriented to;place;situation;time  -AO     Cognitive Assessment/Intervention Comment  Pt unable to identify month/year (repeatedly stating, \"I think it's Tuesday\" when asked what month, even with prompts and given choice of 3 " "months), unable to identify if she was in a hospital, school, or apartment when given 3 choices, and unaware of why she was here. Very confused throughout session, difficulty following commands, delayed responses;  and daughter report this is \"new\" behavior. Report pt has been a little more confused over the past several months but not this confused.  -AO     Row Name 11/18/19 1033          Safety Issues, Functional Mobility    Safety Issues Affecting Function (Mobility)  ability to follow commands;at risk behavior observed;awareness of need for assistance;impulsivity;insight into deficits/self awareness;judgment;problem solving;safety precaution awareness  -AO     Impairments Affecting Function (Mobility)  balance;cognition;endurance/activity tolerance;motor planning;strength  -AO       User Key  (r) = Recorded By, (t) = Taken By, (c) = Cosigned By    Initials Name Provider Type    AO Elizabeth George, PT Physical Therapist        Mobility     Row Name 11/18/19 0930          Bed Mobility Assessment/Treatment    Comment (Bed Mobility)  DNT: Pt sitting EOB at start and end of session  -AO     Row Name 11/18/19 0930          Sit-Stand Transfer    Sit-Stand [place] (Transfers)  minimum assist (75% patient effort)  -AO     Assistive Device (Sit-Stand Transfers)  -- LUE HHA  -AO     Row Name 11/18/19 0930          Gait/Stairs Assessment/Training    Gait/Stairs Assessment/Training  distance ambulated  -AO     [place] Level (Gait)  minimum assist (75% patient effort)  -AO     Assistive Device (Gait)  -- LUE HHA  -AO     Distance in Feet (Gait)  60 ft  -AO     Pattern (Gait)  step-through  -AO     Deviations/Abnormal Patterns (Gait)  base of support, narrow;bailey decreased;gait speed decreased  -AO     Comment (Gait/Stairs)  Mutliple episodes of LOB/unsteadiness with lateral veering and scissored gait  -AO       User Key  (r) = Recorded By, (t) = Taken By, (c) = Cosigned By    Initials Name Provider Type " "   Elizabeth Graham, PT Physical Therapist        Obj/Interventions     Row Name 11/18/19 1033          General ROM    GENERAL ROM COMMENTS  Pt reluctant to follow commands for ROM/MMT and  reports, \"she's playing around\"; difficulty mimicking therapists actions during demonstration of AROM/MMT  -AO     Row Name 11/18/19 1033          Static Sitting Balance    Level of [place] (Unsupported Sitting, Static Balance)  supervision  -AO     Sitting Position (Unsupported Sitting, Static Balance)  sitting on edge of bed  -AO     Time Able to Maintain Position (Unsupported Sitting, Static Balance)  more than 5 minutes  -AO     Row Name 11/18/19 1033          Dynamic Sitting Balance    Level of [place], Reaches Outside Midline (Sitting, Dynamic Balance)  standby assist  -AO     Row Name 11/18/19 1033          Static Standing Balance    Level of [place] (Supported Standing, Static Balance)  contact guard assist  -AO     Time Able to Maintain Position (Supported Standing, Static Balance)  1 to 2 minutes  -AO     Row Name 11/18/19 1033          Dynamic Standing Balance    Comment, Reaches Outside Midline (Standing, Dynamic Balance)  Right SLS: 1 second; Left SLS: 2 seconds; Feet together eyes open/closed: required min A with significant postural sway  -AO     Row Name 11/18/19 1033          Sensory Assessment/Intervention    Sensory General Assessment  no sensation deficits identified  -AO       User Key  (r) = Recorded By, (t) = Taken By, (c) = Cosigned By    Initials Name Provider Type    Elizabeth Graham, PT Physical Therapist        Goals/Plan     Row Name 11/18/19 1033          Bed Mobility Goal 1 (PT)    Activity/Assistive Device (Bed Mobility Goal 1, PT)  bed mobility activities, all  -AO     [place] Level/Cues Needed (Bed Mobility Goal 1, PT)  independent  -AO     Row Name 11/18/19 1033          Transfer Goal 1 (PT)    Activity/Assistive Device (Transfer Goal 1, PT)  transfers, all  -AO  "    [place] Level/Cues Needed (Transfer Goal 1, PT)  conditional independence with least restrictive AD  -AO     Time Frame (Transfer Goal 1, PT)  2 weeks  -AO     Progress/Outcome (Transfer Goal 1, PT)  goal not met  -AO     Row Name 11/18/19 1033          Gait Training Goal 1 (PT)    Activity/Assistive Device (Gait Training Goal 1, PT)  gait (walking locomotion) with least restrictive AD  -AO     [place] Level (Gait Training Goal 1, PT)  conditional independence  -AO     Progress/Outcome (Gait Training Goal 1, PT)  goal not met  -AO     Row Name 11/18/19 1033          Stairs Goal 1 (PT)    Activity/Assistive Device (Stairs Goal 1, PT)  stairs, all skills  -AO     [place] Level/Cues Needed (Stairs Goal 1, PT)  supervision required  -AO     Number of Stairs (Stairs Goal 1, PT)  14 steps  -AO     Time Frame (Stairs Goal 1, PT)  2 weeks  -AO     Progress/Outcome (Stairs Goal 1, PT)  goal not met  -AO       User Key  (r) = Recorded By, (t) = Taken By, (c) = Cosigned By    Initials Name Provider Type    Elizabeth Graham, PT Physical Therapist        Clinical Impression     Row Name 11/18/19 1033          Pain Assessment    Additional Documentation  Pain Scale: Numbers Pre/Post-Treatment (Group)  -AO     Row Name 11/18/19 1033          Pain Scale: Numbers Pre/Post-Treatment    Pain Scale: Numbers, Pretreatment  0/10 - no pain  -AO     Pain Scale: Numbers, Post-Treatment  0/10 - no pain  -AO     Row Name 11/18/19 1033          Plan of Care Review    Plan of Care Reviewed With  patient;spouse;daughter  -AO     Row Name 11/18/19 1033          Physical Therapy Clinical Impression    Patient/Family Goals Statement (PT Clinical Impression)  Pt is an 83 y/o F who presented to [facility] with AMS/confusion of unclear etiology; CT head (-) acute changes but revealed previous old area of infarct in R occipital lobe and lacunar infacts; UA (-); EKG: sinus rhythm with R bundle branch block  -AO     Criteria for Skilled  Interventions Met (PT Clinical Impression)  yes;treatment indicated  -AO     Rehab Potential (PT Clinical Summary)  good, to achieve stated therapy goals  -AO     Row Name 11/18/19 1033          Vital Signs    Recovery Time  Vitals stable and WNL on RA  -AO     Row Name 11/18/19 1033          Positioning and Restraints    Pre-Treatment Position  in bed  -AO     Post Treatment Position  bed  -AO     In Bed  notified nsg;sitting EOB;call light within reach;encouraged to call for assist;with family/caregiver  -AO       User Key  (r) = Recorded By, (t) = Taken By, (c) = Cosigned By    Initials Name Provider Type    Elizabeth Graham, PT Physical Therapist        Outcome Measures     Row Name 11/18/19 1033          Modified [place] Scale    Modified [place] Scale  4 - Moderately severe disability.  Unable to walk without assistance, and unable to attend to own bodily needs without assistance.  -AO     Row Name 11/18/19 1033          Functional Assessment    Outcome Measure Options  Modified [place]  -AO       User Key  (r) = Recorded By, (t) = Taken By, (c) = Cosigned By    Initials Name Provider Type    Elizabeth Graham, PT Physical Therapist        Physical Therapy Education     Title: PT OT SLP Therapies (Done)     Topic: Physical Therapy (Done)     Point: Mobility training (Done)     Learning Progress Summary           Patient Acceptance, E,TB, VU by AO at 11/18/2019 11:31 AM    Comment:  Educated pt and family on risk for falls, safety precautions, and on recommendations for IP rehab at d/c.                               User Key     Initials Effective Dates Name Provider Type Discipline    AO 03/01/19 -  Elizabeth George, PT Physical Therapist PT              PT Recommendation and Plan  Planned Therapy Interventions (PT Eval): balance training, bed mobility training, gait training, neuromuscular re-education, patient/family education, stair training, strengthening, transfer training  Outcome Summary/Treatment  Plan (PT)  Anticipated Discharge Disposition (PT): inpatient rehabilitation facility  Plan of Care Reviewed With: patient, spouse, daughter  Progress: no change  Outcome Summary: Pt presents with AMS/confusion, disoriented to month/year, place, and situation and with confusion throughout session, demonstrating poor safety/deficit awareness. Pt also demonstrates poor standing balance as evidenced by low scores on SLS (1-2 seconds) and MCTSIB position I and II (15 seconds, 4 seconds, respectively) and demonstrates lateral veering with sciossored gait pattern during gait bout 60ft, requiring min A for LOB corrections. Recommending IP rehab at d/c to address cognitive/mobility deficits, as pt is not safe for home (often alone during day) and at high risk for falls. Pt arrived to ED in high heels and  reports she frequently wears heels, educated to refrain from wearing heels secondary to balance deficits. Pt also has 12-14 steps to access home. Plan to see 3x/week at [facility] for progression of mobility.     Time Calculation:   PT Charges     Row Name 11/18/19 1138             Time Calculation    Start Time  1033  -AO      Stop Time  1110  -AO      Time Calculation (min)  37 min  -AO      PT Received On  11/18/19  -AO      PT - Next Appointment  11/20/19  -AO      PT Goal Re-Cert Due Date  12/02/19  -AO         Time Calculation- PT    Total Timed Code Minutes- PT  27 minute(s)  -AO      TCU Minutes- PT  42 min  -AO        User Key  (r) = Recorded By, (t) = Taken By, (c) = Cosigned By    Initials Name Provider Type    AO Elizabeth George, PT Physical Therapist        Therapy Charges for Today     Code Description Service Date Service Provider Modifiers Qty    48831809086 HC PT EVAL MOD COMPLEXITY 4 11/18/2019 Elizabeth George, PT GP 1    94180386795 HC GAIT TRAINING EA 15 MIN 11/18/2019 Elizabeth George, PT GP 1    17651982292 HC PT THERAPEUTIC ACT EA 15 MIN 11/18/2019 Elizabeth George, PT GP 1          PT  G-Codes  Outcome Measure Options: Modified [place]  Modified [place] Scale: 4 - Moderately severe disability.  Unable to walk without assistance, and unable to attend to own bodily needs without assistance.    Elizabeth George, PT  11/18/2019

## 2019-11-18 NOTE — PLAN OF CARE
Problem: Patient Care Overview  Goal: Plan of Care Review  Outcome: Ongoing (interventions implemented as appropriate)   11/18/19 2323   Coping/Psychosocial   Plan of Care Reviewed With patient;daughter;spouse   OTHER   Outcome Summary A Cogntive eval. was completed utilizing the SLUMS. Pt scored 7/30 w/severe deficits in memory, orientation, thought organization,and planning. Educated pt,spouse, and dtr. re results and recs. Rec Rehab, however spouse wants to take pt home. Rec 24 hr S and assist w/meds and HH ST for cognitive defcits for return to baseline. Pt's spouse reported a marked decline in pt's cognition recently. Swallow eval. was also completed and revealed functional swallow skills for regular and thins. Pt c/o heartburn.

## 2019-11-18 NOTE — DISCHARGE SUMMARY
DATE/TIME OF ADMISSION:  11/17/2019 10:21 AM  Date of Discharge:  11/18/2019    Discharge Diagnosis: Active Problems:    Confusion---ACUTE ON CHRONIC; RESOLVED BACK TO HER BASELINE   ETOH ABUSE---URGED THAT THE  NOT BRING IT INTO THE HOME; MARGINAL FOR SAFE LIVING IN THE HOME ; MAY NEED INTERVENTION  HX CVA---IN THE PAST AND STABLE  HYSTERECTOMY  GERD  I URGE DOING A MEDICATION KEEPER CASE TO EASE REGULAR MED ADMINISTRATION  Presenting Problem/History of Present Illness  Active Hospital Problems    Diagnosis  POA   • Confusion [R41.0]  Yes      Resolved Hospital Problems   No resolved problems to display.          Hospital Course  Doris Brenner is a 84 y.o. female who presents with CONFUSION. PER  INCREASED FROM BASELINE. UNFORTUNATELY , THE MENTAL STATUS IS COMPOUNDED BY THE MISUSE OF ALCOHOL WITHIN THE HOUSEHOLD.  IS OF LIMITED UNDERSTANDING ABOUT THIS AND THE NEED FOR REGULAR MEDICATION ADMINISTRATION. SHE CALMED DOWN AND GOT SLEEP PER DAUGHTER IN LAW WHO STAYED WITH HER AFTER RISPIRDOL. WILL USE THIS AT DISCHARGE AS WELL AS SHE IS SUNDOWNING HERE AND AT HOME. SHE WILL HAVE HER  TO STAY WITH HER AND SHE CAN BE DISCHARGED AS MEDICALLY STABLE       Procedures Performed  CT , LABS         Consults:   Consults     Date and Time Order Name Status Description    11/17/2019 1221 Family Medicine Consult Completed           Pertinent Test Results:    Lab Results (most recent)     Procedure Component Value Units Date/Time    Extra Tubes [[id number]] Collected:  11/17/19 1126    Specimen:  Blood, Venous Line Updated:  11/17/19 1231    Narrative:       The following orders were created for panel order Extra Tubes.  Procedure                               Abnormality         Status                     ---------                               -----------         ------                     Light Blue Top[[id number]]                                   Final result               Gold Top -  SST[[id number]]                                   Final result               Green Top (Gel)[[id number]]                                  Final result                 Please view results for these tests on the individual orders.    Light Blue Top [[id number]] Collected:  11/17/19 1126    Specimen:  Blood Updated:  11/17/19 1231     Extra Tube hold for add-on     Comment: Auto resulted       Gold Top - SST [[id number]] Collected:  11/17/19 1126    Specimen:  Blood Updated:  11/17/19 1231     Extra Tube Hold for add-ons.     Comment: Auto resulted.       Green Top (Gel) [[id number]] Collected:  11/17/19 1126    Specimen:  Blood Updated:  11/17/19 1231     Extra Tube Hold for add-ons.     Comment: Auto resulted.       Comprehensive Metabolic Panel [[id number]]  (Abnormal) Collected:  11/17/19 1126    Specimen:  Blood Updated:  11/17/19 1200     Glucose 105 mg/dL      BUN 14 mg/dL      Creatinine 1.03 mg/dL      Sodium 143 mmol/L      Potassium 3.7 mmol/L      Chloride 102 mmol/L      CO2 28.0 mmol/L      Calcium 9.3 mg/dL      Total Protein 7.1 g/dL      Albumin 4.20 g/dL      ALT (SGPT) 13 U/L      AST (SGOT) 20 U/L      Alkaline Phosphatase 68 U/L      Total Bilirubin 0.8 mg/dL      eGFR Non African Amer 51 mL/min/1.73      Globulin 2.9 gm/dL      A/G Ratio 1.4 g/dL      BUN/Creatinine Ratio 13.6     Anion Gap 13.0 mmol/L     Narrative:       GFR Normal >60  Chronic Kidney Disease <60  Kidney Failure <15    Troponin [[id number]]  (Normal) Collected:  11/17/19 1126    Specimen:  Blood Updated:  11/17/19 1158     Troponin T <0.010 ng/mL     Narrative:       Troponin T Reference Range:  <= 0.03 ng/mL-   Negative for AMI  >0.03 ng/mL-     Abnormal for myocardial necrosis.  Clinicians would have to utilize clinical acumen, EKG, Troponin and serial changes to determine if it is an Acute Myocardial Infarction or myocardial injury due to an underlying chronic condition.     Urinalysis, Microscopic Only - Urine, Catheter In/Out  [[id number]]  (Abnormal) Collected:  11/17/19 1125    Specimen:  Urine, Catheter In/Out Updated:  11/17/19 1149     RBC, UA 3-5 /HPF      WBC, UA 6-12 /HPF      Bacteria, UA None Seen /HPF      Squamous Epithelial Cells, UA 7-12 /HPF      Hyaline Casts, UA 13-20 /LPF      Mucus, UA Trace /HPF      Methodology Manual Light Microscopy    Urine Culture - Urine, Urine, Catheter In/Out [[id number]] Collected:  11/17/19 1125    Specimen:  Urine, Catheter In/Out Updated:  11/17/19 1149    Urinalysis With Culture If Indicated - Urine, Catheter In/Out [[id number]]  (Abnormal) Collected:  11/17/19 1125    Specimen:  Urine, Catheter In/Out Updated:  11/17/19 1142     Color, UA Dark Yellow     Appearance, UA Hazy     pH, UA 5.5     Specific Gravity, UA 1.029     Glucose, UA Negative     Ketones, UA Trace     Bilirubin, UA Small (1+)     Comment: Confirmation testing is unavailable.  A serum bilirubin is recommended for further assessment.        Blood, UA Negative     Protein, UA 30 mg/dL (1+)     Leuk Esterase, UA Trace     Nitrite, UA Negative     Urobilinogen, UA 1.0 E.U./dL    CBC & Differential [[id number]] Collected:  11/17/19 1126    Specimen:  Blood Updated:  11/17/19 1134    Narrative:       The following orders were created for panel order CBC & Differential.  Procedure                               Abnormality         Status                     ---------                               -----------         ------                     CBC Auto Differential[[id number]]        Abnormal            Final result                 Please view results for these tests on the individual orders.    CBC Auto Differential [[id number]]  (Abnormal) Collected:  11/17/19 1126    Specimen:  Blood Updated:  11/17/19 1134     WBC 5.80 10*3/mm3      RBC 4.30 10*6/mm3      Hemoglobin 14.8 g/dL      Hematocrit 42.2 %      MCV 98.0 fL      MCH 34.3 pg      MCHC 35.0 g/dL      RDW 12.7 %      RDW-SD 43.8 fl      MPV 9.1 fL      Platelets 214 10*3/mm3       Neutrophil % 65.0 %      Lymphocyte % 20.0 %      Monocyte % 11.6 %      Eosinophil % 2.0 %      Basophil % 1.4 %      Neutrophils, Absolute 3.80 10*3/mm3      Lymphocytes, Absolute 1.20 10*3/mm3      Monocytes, Absolute 0.70 10*3/mm3      Eosinophils, Absolute 0.10 10*3/mm3      Basophils, Absolute 0.10 10*3/mm3      nRBC 0.2 /100 WBC     POC Glucose Once [[id number]]  (Normal) Collected:  11/17/19 1022    Specimen:  Blood Updated:  11/17/19 1023     Glucose 102 mg/dL      Comment: Serial Number: [id number]:  [id number]                    OK       Condition on Discharge:  FAIR    Vital Signs  Temp:  [97.5 °F (36.4 °C)-98 °F (36.7 °C)] 97.5 °F (36.4 °C)  Heart Rate:  [59-88] 59  Resp:  [16-26] 16  BP: (102-205)/(44-75) 152/44    Physical Exam:     General Appearance:    Alert, cooperative, in no acute distress EXCEPT ANXIOUS ABOUT DISCHARGE   Head:    Normocephalic, without obvious abnormality, atraumatic   Eyes:            Lids and lashes normal, conjunctivae and sclerae normal, no   icterus, no pallor, corneas clear, PERRLA   Ears:    Ears appear intact with no abnormalities noted   Throat:   No oral lesions, no thrush, oral mucosa moist   Neck:   No adenopathy, supple, trachea midline, no thyromegaly, no   carotid bruit, no JVD   Lungs:     Clear to auscultation,respirations regular, even and                  unlabored    Heart:    Regular rhythm and normal rate, normal S1 and S2, no            murmur, no gallop, no rub, no click   Chest Wall:    No abnormalities observed   Abdomen:     Normal bowel sounds, no masses, no organomegaly, soft        non-tender, non-distended, no guarding, no rebound                tenderness   Extremities:   Moves all extremities well, no edema, no cyanosis, no             redness   Pulses:   Pulses palpable and equal bilaterally   Skin:   No bleeding, bruising or rash   Lymph nodes:   No palpable adenopathy   Neurologic:   Cranial nerves 2 - 12 grossly intact, sensation  intact, DTR       present and equal bilaterally; ORIENTED X 2, ALERT, FOLLOWS COMMANDS       Discharge Disposition TO HER HOME WITH SOMEONE STAYING WITH HER FOR SAFETY      Discharge Medications   RISPERIDOL 0.5MG QHS      Discharge Medications      ASK your doctor about these medications      Instructions Start Date   DULoxetine 60 MG capsule  Commonly known as:  CYMBALTA   60 mg, Oral, 2 Times Daily      omeprazole 20 MG capsule  Commonly known as:  priLOSEC   20 mg, Oral, Daily      simvastatin 80 MG tablet  Commonly known as:  ZOCOR   80 mg, Oral, Nightly      valsartan 160 MG tablet  Commonly known as:  DIOVAN   160 mg, Oral, Daily             Discharge Diet: regular    Activity at Discharge: AD TL     Follow-up Appointments   1-2 WEEKS WITH DR GAY  No future appointments.      Test Results Pending at Discharge   Order Current Status    Urine Culture - Urine, Urine, Catheter In/Out In process           Florecita Gay MD  11/18/19  8:49 AM

## 2019-11-19 LAB — BACTERIA SPEC AEROBE CULT: NO GROWTH

## 2020-02-19 ENCOUNTER — LAB (OUTPATIENT)
Dept: LAB | Facility: HOSPITAL | Age: 85
End: 2020-02-19

## 2020-02-19 ENCOUNTER — TRANSCRIBE ORDERS (OUTPATIENT)
Dept: ADMINISTRATIVE | Facility: HOSPITAL | Age: 85
End: 2020-02-19

## 2020-02-19 DIAGNOSIS — F34.1 CHRONIC DEPRESSIVE PERSON: ICD-10-CM

## 2020-02-19 DIAGNOSIS — E78.81 LIPOID DERMATOARTHRITIS: ICD-10-CM

## 2020-02-19 DIAGNOSIS — F03.90 SENILE DEMENTIA, UNCOMPLICATED (HCC): ICD-10-CM

## 2020-02-19 DIAGNOSIS — E78.81 LIPOID DERMATOARTHRITIS: Primary | ICD-10-CM

## 2020-02-19 LAB
ALBUMIN SERPL-MCNC: 4.3 G/DL (ref 3.5–5.2)
ALBUMIN/GLOB SERPL: 1.7 G/DL
ALP SERPL-CCNC: 61 U/L (ref 39–117)
ALT SERPL W P-5'-P-CCNC: 10 U/L (ref 1–33)
ANION GAP SERPL CALCULATED.3IONS-SCNC: 11.9 MMOL/L (ref 5–15)
AST SERPL-CCNC: 19 U/L (ref 1–32)
BASOPHILS # BLD AUTO: 0.07 10*3/MM3 (ref 0–0.2)
BASOPHILS NFR BLD AUTO: 1.2 % (ref 0–1.5)
BILIRUB SERPL-MCNC: 0.8 MG/DL (ref 0.2–1.2)
BUN BLD-MCNC: 17 MG/DL (ref 8–23)
BUN/CREAT SERPL: 18.7 (ref 7–25)
CALCIUM SPEC-SCNC: 9.4 MG/DL (ref 8.6–10.5)
CHLORIDE SERPL-SCNC: 104 MMOL/L (ref 98–107)
CHOLEST SERPL-MCNC: 215 MG/DL (ref 0–200)
CO2 SERPL-SCNC: 27.1 MMOL/L (ref 22–29)
CREAT BLD-MCNC: 0.91 MG/DL (ref 0.57–1)
DEPRECATED RDW RBC AUTO: 41.4 FL (ref 37–54)
EOSINOPHIL # BLD AUTO: 0.09 10*3/MM3 (ref 0–0.4)
EOSINOPHIL NFR BLD AUTO: 1.6 % (ref 0.3–6.2)
ERYTHROCYTE [DISTWIDTH] IN BLOOD BY AUTOMATED COUNT: 11.9 % (ref 12.3–15.4)
GFR SERPL CREATININE-BSD FRML MDRD: 59 ML/MIN/1.73
GLOBULIN UR ELPH-MCNC: 2.6 GM/DL
GLUCOSE BLD-MCNC: 103 MG/DL (ref 65–99)
HCT VFR BLD AUTO: 41.5 % (ref 34–46.6)
HDLC SERPL-MCNC: 66 MG/DL (ref 40–60)
HGB BLD-MCNC: 14.4 G/DL (ref 12–15.9)
IMM GRANULOCYTES # BLD AUTO: 0.01 10*3/MM3 (ref 0–0.05)
IMM GRANULOCYTES NFR BLD AUTO: 0.2 % (ref 0–0.5)
LDLC SERPL CALC-MCNC: 122 MG/DL (ref 0–100)
LDLC/HDLC SERPL: 1.85 {RATIO}
LYMPHOCYTES # BLD AUTO: 1.07 10*3/MM3 (ref 0.7–3.1)
LYMPHOCYTES NFR BLD AUTO: 18.5 % (ref 19.6–45.3)
MCH RBC QN AUTO: 32.9 PG (ref 26.6–33)
MCHC RBC AUTO-ENTMCNC: 34.7 G/DL (ref 31.5–35.7)
MCV RBC AUTO: 94.7 FL (ref 79–97)
MONOCYTES # BLD AUTO: 0.51 10*3/MM3 (ref 0.1–0.9)
MONOCYTES NFR BLD AUTO: 8.8 % (ref 5–12)
NEUTROPHILS # BLD AUTO: 4.03 10*3/MM3 (ref 1.7–7)
NEUTROPHILS NFR BLD AUTO: 69.7 % (ref 42.7–76)
NRBC BLD AUTO-RTO: 0 /100 WBC (ref 0–0.2)
PLATELET # BLD AUTO: 209 10*3/MM3 (ref 140–450)
PMV BLD AUTO: 11.2 FL (ref 6–12)
POTASSIUM BLD-SCNC: 4.3 MMOL/L (ref 3.5–5.2)
PROT SERPL-MCNC: 6.9 G/DL (ref 6–8.5)
RBC # BLD AUTO: 4.38 10*6/MM3 (ref 3.77–5.28)
SODIUM BLD-SCNC: 143 MMOL/L (ref 136–145)
TRIGL SERPL-MCNC: 133 MG/DL (ref 0–150)
TSH SERPL DL<=0.05 MIU/L-ACNC: 1.57 UIU/ML (ref 0.27–4.2)
VLDLC SERPL-MCNC: 26.6 MG/DL (ref 5–40)
WBC NRBC COR # BLD: 5.78 10*3/MM3 (ref 3.4–10.8)

## 2020-02-19 PROCEDURE — 80053 COMPREHEN METABOLIC PANEL: CPT

## 2020-02-19 PROCEDURE — 80061 LIPID PANEL: CPT

## 2020-02-19 PROCEDURE — 36415 COLL VENOUS BLD VENIPUNCTURE: CPT

## 2020-02-19 PROCEDURE — 84443 ASSAY THYROID STIM HORMONE: CPT

## 2020-02-19 PROCEDURE — 85025 COMPLETE CBC W/AUTO DIFF WBC: CPT

## 2021-01-01 ENCOUNTER — APPOINTMENT (OUTPATIENT)
Dept: MRI IMAGING | Facility: HOSPITAL | Age: 86
End: 2021-01-01

## 2021-01-01 ENCOUNTER — APPOINTMENT (OUTPATIENT)
Dept: GENERAL RADIOLOGY | Facility: HOSPITAL | Age: 86
End: 2021-01-01

## 2021-01-01 ENCOUNTER — APPOINTMENT (OUTPATIENT)
Dept: CT IMAGING | Facility: HOSPITAL | Age: 86
End: 2021-01-01

## 2021-01-01 ENCOUNTER — HOSPITAL ENCOUNTER (INPATIENT)
Facility: HOSPITAL | Age: 86
LOS: 3 days | End: 2021-08-08
Attending: EMERGENCY MEDICINE | Admitting: FAMILY MEDICINE

## 2021-01-01 ENCOUNTER — APPOINTMENT (OUTPATIENT)
Dept: NEUROLOGY | Facility: HOSPITAL | Age: 86
End: 2021-01-01

## 2021-01-01 VITALS
RESPIRATION RATE: 21 BRPM | HEART RATE: 84 BPM | BODY MASS INDEX: 17.12 KG/M2 | OXYGEN SATURATION: 96 % | TEMPERATURE: 98 F | WEIGHT: 102.73 LBS | DIASTOLIC BLOOD PRESSURE: 51 MMHG | HEIGHT: 65 IN | SYSTOLIC BLOOD PRESSURE: 142 MMHG

## 2021-01-01 DIAGNOSIS — R41.82 ALTERED MENTAL STATUS, UNSPECIFIED ALTERED MENTAL STATUS TYPE: Primary | ICD-10-CM

## 2021-01-01 LAB
ALBUMIN SERPL-MCNC: 3.6 G/DL (ref 3.5–5.2)
ALBUMIN/GLOB SERPL: 1.5 G/DL
ALP SERPL-CCNC: 51 U/L (ref 39–117)
ALT SERPL W P-5'-P-CCNC: 17 U/L (ref 1–33)
ANION GAP SERPL CALCULATED.3IONS-SCNC: 10 MMOL/L (ref 5–15)
ANION GAP SERPL CALCULATED.3IONS-SCNC: 11 MMOL/L (ref 5–15)
ANION GAP SERPL CALCULATED.3IONS-SCNC: 12 MMOL/L (ref 5–15)
APTT PPP: 26.2 SECONDS (ref 24–31)
APTT PPP: 26.6 SECONDS (ref 24–31)
AST SERPL-CCNC: 49 U/L (ref 1–32)
BASOPHILS # BLD AUTO: 0.1 10*3/MM3 (ref 0–0.2)
BASOPHILS # BLD AUTO: 0.1 10*3/MM3 (ref 0–0.2)
BASOPHILS NFR BLD AUTO: 0.9 % (ref 0–1.5)
BASOPHILS NFR BLD AUTO: 1 % (ref 0–1.5)
BILIRUB SERPL-MCNC: 0.8 MG/DL (ref 0–1.2)
BILIRUB UR QL STRIP: NEGATIVE
BUN SERPL-MCNC: 11 MG/DL (ref 8–23)
BUN SERPL-MCNC: 12 MG/DL (ref 8–23)
BUN SERPL-MCNC: 15 MG/DL (ref 8–23)
BUN/CREAT SERPL: 18.3 (ref 7–25)
BUN/CREAT SERPL: 20.5 (ref 7–25)
BUN/CREAT SERPL: 20.7 (ref 7–25)
CALCIUM SPEC-SCNC: 8.6 MG/DL (ref 8.6–10.5)
CALCIUM SPEC-SCNC: 8.8 MG/DL (ref 8.6–10.5)
CALCIUM SPEC-SCNC: 9 MG/DL (ref 8.6–10.5)
CHLORIDE SERPL-SCNC: 101 MMOL/L (ref 98–107)
CHLORIDE SERPL-SCNC: 102 MMOL/L (ref 98–107)
CHLORIDE SERPL-SCNC: 104 MMOL/L (ref 98–107)
CHOLEST SERPL-MCNC: 194 MG/DL (ref 0–200)
CLARITY UR: CLEAR
CO2 SERPL-SCNC: 22 MMOL/L (ref 22–29)
CO2 SERPL-SCNC: 26 MMOL/L (ref 22–29)
CO2 SERPL-SCNC: 27 MMOL/L (ref 22–29)
COLOR UR: YELLOW
CREAT SERPL-MCNC: 0.58 MG/DL (ref 0.57–1)
CREAT SERPL-MCNC: 0.6 MG/DL (ref 0.57–1)
CREAT SERPL-MCNC: 0.73 MG/DL (ref 0.57–1)
DEPRECATED RDW RBC AUTO: 45.1 FL (ref 37–54)
DEPRECATED RDW RBC AUTO: 45.1 FL (ref 37–54)
DEPRECATED RDW RBC AUTO: 45.5 FL (ref 37–54)
EOSINOPHIL # BLD AUTO: 0.1 10*3/MM3 (ref 0–0.4)
EOSINOPHIL # BLD AUTO: 0.1 10*3/MM3 (ref 0–0.4)
EOSINOPHIL NFR BLD AUTO: 0.7 % (ref 0.3–6.2)
EOSINOPHIL NFR BLD AUTO: 1.1 % (ref 0.3–6.2)
ERYTHROCYTE [DISTWIDTH] IN BLOOD BY AUTOMATED COUNT: 13.6 % (ref 12.3–15.4)
ERYTHROCYTE [DISTWIDTH] IN BLOOD BY AUTOMATED COUNT: 13.7 % (ref 12.3–15.4)
ERYTHROCYTE [DISTWIDTH] IN BLOOD BY AUTOMATED COUNT: 13.7 % (ref 12.3–15.4)
FOLATE SERPL-MCNC: >20 NG/ML (ref 4.78–24.2)
GFR SERPL CREATININE-BSD FRML MDRD: 76 ML/MIN/1.73
GFR SERPL CREATININE-BSD FRML MDRD: 95 ML/MIN/1.73
GFR SERPL CREATININE-BSD FRML MDRD: 99 ML/MIN/1.73
GLOBULIN UR ELPH-MCNC: 2.4 GM/DL
GLUCOSE BLDC GLUCOMTR-MCNC: 102 MG/DL (ref 70–105)
GLUCOSE BLDC GLUCOMTR-MCNC: 109 MG/DL (ref 70–105)
GLUCOSE BLDC GLUCOMTR-MCNC: 122 MG/DL (ref 70–105)
GLUCOSE BLDC GLUCOMTR-MCNC: 149 MG/DL (ref 70–105)
GLUCOSE BLDC GLUCOMTR-MCNC: 80 MG/DL (ref 70–105)
GLUCOSE BLDC GLUCOMTR-MCNC: 82 MG/DL (ref 70–105)
GLUCOSE BLDC GLUCOMTR-MCNC: 89 MG/DL (ref 70–105)
GLUCOSE BLDC GLUCOMTR-MCNC: 89 MG/DL (ref 70–105)
GLUCOSE BLDC GLUCOMTR-MCNC: 95 MG/DL (ref 70–105)
GLUCOSE BLDC GLUCOMTR-MCNC: 97 MG/DL (ref 70–105)
GLUCOSE BLDC GLUCOMTR-MCNC: 98 MG/DL (ref 70–105)
GLUCOSE SERPL-MCNC: 100 MG/DL (ref 65–99)
GLUCOSE SERPL-MCNC: 109 MG/DL (ref 65–99)
GLUCOSE SERPL-MCNC: 90 MG/DL (ref 65–99)
GLUCOSE UR STRIP-MCNC: NEGATIVE MG/DL
HBA1C MFR BLD: 5.2 % (ref 3.5–5.6)
HCT VFR BLD AUTO: 40.3 % (ref 34–46.6)
HCT VFR BLD AUTO: 40.8 % (ref 34–46.6)
HCT VFR BLD AUTO: 42.8 % (ref 34–46.6)
HDLC SERPL-MCNC: 70 MG/DL (ref 40–60)
HGB BLD-MCNC: 14 G/DL (ref 12–15.9)
HGB BLD-MCNC: 14.4 G/DL (ref 12–15.9)
HGB BLD-MCNC: 15.1 G/DL (ref 12–15.9)
HGB UR QL STRIP.AUTO: NEGATIVE
HOLD SPECIMEN: NORMAL
HOLD SPECIMEN: NORMAL
INR PPP: 1.03 (ref 0.93–1.1)
KETONES UR QL STRIP: NEGATIVE
LDLC SERPL CALC-MCNC: 103 MG/DL (ref 0–100)
LDLC/HDLC SERPL: 1.42 {RATIO}
LEUKOCYTE ESTERASE UR QL STRIP.AUTO: NEGATIVE
LIPASE SERPL-CCNC: 31 U/L (ref 13–60)
LYMPHOCYTES # BLD AUTO: 1.2 10*3/MM3 (ref 0.7–3.1)
LYMPHOCYTES # BLD AUTO: 1.6 10*3/MM3 (ref 0.7–3.1)
LYMPHOCYTES NFR BLD AUTO: 15 % (ref 19.6–45.3)
LYMPHOCYTES NFR BLD AUTO: 20.2 % (ref 19.6–45.3)
MAGNESIUM SERPL-MCNC: 1.6 MG/DL (ref 1.6–2.4)
MAGNESIUM SERPL-MCNC: 1.8 MG/DL (ref 1.6–2.4)
MCH RBC QN AUTO: 33.1 PG (ref 26.6–33)
MCH RBC QN AUTO: 33.4 PG (ref 26.6–33)
MCH RBC QN AUTO: 33.5 PG (ref 26.6–33)
MCHC RBC AUTO-ENTMCNC: 34.8 G/DL (ref 31.5–35.7)
MCHC RBC AUTO-ENTMCNC: 35.3 G/DL (ref 31.5–35.7)
MCHC RBC AUTO-ENTMCNC: 35.4 G/DL (ref 31.5–35.7)
MCV RBC AUTO: 94.3 FL (ref 79–97)
MCV RBC AUTO: 94.9 FL (ref 79–97)
MCV RBC AUTO: 95.2 FL (ref 79–97)
MONOCYTES # BLD AUTO: 0.8 10*3/MM3 (ref 0.1–0.9)
MONOCYTES # BLD AUTO: 0.8 10*3/MM3 (ref 0.1–0.9)
MONOCYTES NFR BLD AUTO: 10.3 % (ref 5–12)
MONOCYTES NFR BLD AUTO: 10.8 % (ref 5–12)
NEUTROPHILS NFR BLD AUTO: 5.2 10*3/MM3 (ref 1.7–7)
NEUTROPHILS NFR BLD AUTO: 5.8 10*3/MM3 (ref 1.7–7)
NEUTROPHILS NFR BLD AUTO: 67 % (ref 42.7–76)
NEUTROPHILS NFR BLD AUTO: 73 % (ref 42.7–76)
NITRITE UR QL STRIP: NEGATIVE
NRBC BLD AUTO-RTO: 0.1 /100 WBC (ref 0–0.2)
NRBC BLD AUTO-RTO: 0.1 /100 WBC (ref 0–0.2)
PH UR STRIP.AUTO: 6.5 [PH] (ref 5–8)
PHOSPHATE SERPL-MCNC: 3 MG/DL (ref 2.5–4.5)
PLATELET # BLD AUTO: 222 10*3/MM3 (ref 140–450)
PLATELET # BLD AUTO: 229 10*3/MM3 (ref 140–450)
PLATELET # BLD AUTO: 248 10*3/MM3 (ref 140–450)
PMV BLD AUTO: 8.5 FL (ref 6–12)
PMV BLD AUTO: 8.6 FL (ref 6–12)
PMV BLD AUTO: 9 FL (ref 6–12)
POTASSIUM SERPL-SCNC: 3.1 MMOL/L (ref 3.5–5.2)
POTASSIUM SERPL-SCNC: 3.6 MMOL/L (ref 3.5–5.2)
POTASSIUM SERPL-SCNC: 3.9 MMOL/L (ref 3.5–5.2)
PROT SERPL-MCNC: 6 G/DL (ref 6–8.5)
PROT UR QL STRIP: NEGATIVE
PROTHROMBIN TIME: 11.4 SECONDS (ref 9.6–11.7)
QT INTERVAL: 498 MS
RBC # BLD AUTO: 4.24 10*6/MM3 (ref 3.77–5.28)
RBC # BLD AUTO: 4.3 10*6/MM3 (ref 3.77–5.28)
RBC # BLD AUTO: 4.54 10*6/MM3 (ref 3.77–5.28)
SARS-COV-2 RNA PNL SPEC NAA+PROBE: NOT DETECTED
SODIUM SERPL-SCNC: 137 MMOL/L (ref 136–145)
SODIUM SERPL-SCNC: 137 MMOL/L (ref 136–145)
SODIUM SERPL-SCNC: 141 MMOL/L (ref 136–145)
SP GR UR STRIP: 1.02 (ref 1–1.03)
T4 FREE SERPL-MCNC: 1.28 NG/DL (ref 0.93–1.7)
TRIGL SERPL-MCNC: 122 MG/DL (ref 0–150)
TROPONIN T SERPL-MCNC: 0.02 NG/ML (ref 0–0.03)
TSH SERPL DL<=0.05 MIU/L-ACNC: 3.86 UIU/ML (ref 0.27–4.2)
UROBILINOGEN UR QL STRIP: NORMAL
VIT B12 BLD-MCNC: 664 PG/ML (ref 211–946)
VLDLC SERPL-MCNC: 21 MG/DL (ref 5–40)
WBC # BLD AUTO: 10 10*3/MM3 (ref 3.4–10.8)
WBC # BLD AUTO: 7.7 10*3/MM3 (ref 3.4–10.8)
WBC # BLD AUTO: 7.9 10*3/MM3 (ref 3.4–10.8)
WHOLE BLOOD HOLD SPECIMEN: NORMAL

## 2021-01-01 PROCEDURE — 80048 BASIC METABOLIC PNL TOTAL CA: CPT | Performed by: FAMILY MEDICINE

## 2021-01-01 PROCEDURE — 85027 COMPLETE CBC AUTOMATED: CPT | Performed by: FAMILY MEDICINE

## 2021-01-01 PROCEDURE — 71045 X-RAY EXAM CHEST 1 VIEW: CPT

## 2021-01-01 PROCEDURE — 74018 RADEX ABDOMEN 1 VIEW: CPT

## 2021-01-01 PROCEDURE — 99285 EMERGENCY DEPT VISIT HI MDM: CPT

## 2021-01-01 PROCEDURE — 36415 COLL VENOUS BLD VENIPUNCTURE: CPT | Performed by: PSYCHIATRY & NEUROLOGY

## 2021-01-01 PROCEDURE — 25010000002 HYDRALAZINE PER 20 MG: Performed by: FAMILY MEDICINE

## 2021-01-01 PROCEDURE — 82962 GLUCOSE BLOOD TEST: CPT

## 2021-01-01 PROCEDURE — 36415 COLL VENOUS BLD VENIPUNCTURE: CPT | Performed by: FAMILY MEDICINE

## 2021-01-01 PROCEDURE — G0378 HOSPITAL OBSERVATION PER HR: HCPCS

## 2021-01-01 PROCEDURE — 95819 EEG AWAKE AND ASLEEP: CPT | Performed by: PSYCHIATRY & NEUROLOGY

## 2021-01-01 PROCEDURE — 70450 CT HEAD/BRAIN W/O DYE: CPT

## 2021-01-01 PROCEDURE — 0 POTASSIUM CHLORIDE 10 MEQ/100ML SOLUTION: Performed by: FAMILY MEDICINE

## 2021-01-01 PROCEDURE — 81003 URINALYSIS AUTO W/O SCOPE: CPT | Performed by: EMERGENCY MEDICINE

## 2021-01-01 PROCEDURE — 80053 COMPREHEN METABOLIC PANEL: CPT | Performed by: EMERGENCY MEDICINE

## 2021-01-01 PROCEDURE — 25010000002 LEVETRIRACETAM PER 10 MG: Performed by: FAMILY MEDICINE

## 2021-01-01 PROCEDURE — 82746 ASSAY OF FOLIC ACID SERUM: CPT | Performed by: PSYCHIATRY & NEUROLOGY

## 2021-01-01 PROCEDURE — 25010000002 LORAZEPAM PER 2 MG: Performed by: FAMILY MEDICINE

## 2021-01-01 PROCEDURE — 85025 COMPLETE CBC W/AUTO DIFF WBC: CPT | Performed by: FAMILY MEDICINE

## 2021-01-01 PROCEDURE — 70551 MRI BRAIN STEM W/O DYE: CPT

## 2021-01-01 PROCEDURE — 83735 ASSAY OF MAGNESIUM: CPT

## 2021-01-01 PROCEDURE — 82607 VITAMIN B-12: CPT | Performed by: FAMILY MEDICINE

## 2021-01-01 PROCEDURE — P9612 CATHETERIZE FOR URINE SPEC: HCPCS

## 2021-01-01 PROCEDURE — 80061 LIPID PANEL: CPT | Performed by: FAMILY MEDICINE

## 2021-01-01 PROCEDURE — 92610 EVALUATE SWALLOWING FUNCTION: CPT

## 2021-01-01 PROCEDURE — 99232 SBSQ HOSP IP/OBS MODERATE 35: CPT | Performed by: PSYCHIATRY & NEUROLOGY

## 2021-01-01 PROCEDURE — 85730 THROMBOPLASTIN TIME PARTIAL: CPT | Performed by: FAMILY MEDICINE

## 2021-01-01 PROCEDURE — 97161 PT EVAL LOW COMPLEX 20 MIN: CPT

## 2021-01-01 PROCEDURE — 25010000003 POTASSIUM CHLORIDE 10 MEQ/100ML SOLUTION: Performed by: FAMILY MEDICINE

## 2021-01-01 PROCEDURE — 85730 THROMBOPLASTIN TIME PARTIAL: CPT | Performed by: EMERGENCY MEDICINE

## 2021-01-01 PROCEDURE — 84100 ASSAY OF PHOSPHORUS: CPT

## 2021-01-01 PROCEDURE — 99231 SBSQ HOSP IP/OBS SF/LOW 25: CPT

## 2021-01-01 PROCEDURE — 83036 HEMOGLOBIN GLYCOSYLATED A1C: CPT | Performed by: FAMILY MEDICINE

## 2021-01-01 PROCEDURE — 93005 ELECTROCARDIOGRAM TRACING: CPT | Performed by: EMERGENCY MEDICINE

## 2021-01-01 PROCEDURE — 84439 ASSAY OF FREE THYROXINE: CPT | Performed by: PSYCHIATRY & NEUROLOGY

## 2021-01-01 PROCEDURE — 85025 COMPLETE CBC W/AUTO DIFF WBC: CPT | Performed by: EMERGENCY MEDICINE

## 2021-01-01 PROCEDURE — 25010000002 LORAZEPAM PER 2 MG: Performed by: NURSE PRACTITIONER

## 2021-01-01 PROCEDURE — 87635 SARS-COV-2 COVID-19 AMP PRB: CPT | Performed by: EMERGENCY MEDICINE

## 2021-01-01 PROCEDURE — 25010000002 MORPHINE PER 10 MG: Performed by: FAMILY MEDICINE

## 2021-01-01 PROCEDURE — 83735 ASSAY OF MAGNESIUM: CPT | Performed by: FAMILY MEDICINE

## 2021-01-01 PROCEDURE — 25010000002 MORPHINE PER 10 MG: Performed by: NURSE PRACTITIONER

## 2021-01-01 PROCEDURE — 99231 SBSQ HOSP IP/OBS SF/LOW 25: CPT | Performed by: PSYCHIATRY & NEUROLOGY

## 2021-01-01 PROCEDURE — 85610 PROTHROMBIN TIME: CPT | Performed by: EMERGENCY MEDICINE

## 2021-01-01 PROCEDURE — 25010000002 LORAZEPAM PER 2 MG

## 2021-01-01 PROCEDURE — 83690 ASSAY OF LIPASE: CPT | Performed by: EMERGENCY MEDICINE

## 2021-01-01 PROCEDURE — 84484 ASSAY OF TROPONIN QUANT: CPT | Performed by: EMERGENCY MEDICINE

## 2021-01-01 PROCEDURE — 95819 EEG AWAKE AND ASLEEP: CPT

## 2021-01-01 PROCEDURE — 84443 ASSAY THYROID STIM HORMONE: CPT | Performed by: FAMILY MEDICINE

## 2021-01-01 RX ORDER — SENNA PLUS 8.6 MG/1
1 TABLET ORAL DAILY
COMMUNITY

## 2021-01-01 RX ORDER — SODIUM CHLORIDE 0.9 % (FLUSH) 0.9 %
3 SYRINGE (ML) INJECTION EVERY 12 HOURS SCHEDULED
Status: DISCONTINUED | OUTPATIENT
Start: 2021-01-01 | End: 2021-01-01 | Stop reason: HOSPADM

## 2021-01-01 RX ORDER — SODIUM CHLORIDE 0.9 % (FLUSH) 0.9 %
10 SYRINGE (ML) INJECTION AS NEEDED
Status: DISCONTINUED | OUTPATIENT
Start: 2021-01-01 | End: 2021-01-01 | Stop reason: HOSPADM

## 2021-01-01 RX ORDER — RISPERIDONE 0.5 MG/1
0.5 TABLET, ORALLY DISINTEGRATING ORAL ONCE
Status: COMPLETED | OUTPATIENT
Start: 2021-01-01 | End: 2021-01-01

## 2021-01-01 RX ORDER — CHOLECALCIFEROL (VITAMIN D3) 125 MCG
5 CAPSULE ORAL NIGHTLY PRN
Status: DISCONTINUED | OUTPATIENT
Start: 2021-01-01 | End: 2021-01-01

## 2021-01-01 RX ORDER — CLONIDINE HYDROCHLORIDE 0.1 MG/1
0.1 TABLET ORAL ONCE AS NEEDED
Status: DISCONTINUED | OUTPATIENT
Start: 2021-01-01 | End: 2021-01-01

## 2021-01-01 RX ORDER — POTASSIUM CHLORIDE 7.45 MG/ML
10 INJECTION INTRAVENOUS
Status: DISCONTINUED | OUTPATIENT
Start: 2021-01-01 | End: 2021-01-01

## 2021-01-01 RX ORDER — CLONIDINE HYDROCHLORIDE 0.1 MG/1
0.1 TABLET ORAL ONCE AS NEEDED
Status: COMPLETED | OUTPATIENT
Start: 2021-01-01 | End: 2021-01-01

## 2021-01-01 RX ORDER — SENNA PLUS 8.6 MG/1
1 TABLET ORAL DAILY
Status: DISCONTINUED | OUTPATIENT
Start: 2021-01-01 | End: 2021-01-01

## 2021-01-01 RX ORDER — CARBOXYMETHYLCELLULOSE SODIUM 10 MG/ML
2 GEL OPHTHALMIC DAILY
COMMUNITY

## 2021-01-01 RX ORDER — MORPHINE SULFATE 4 MG/ML
2 INJECTION, SOLUTION INTRAMUSCULAR; INTRAVENOUS
Status: DISCONTINUED | OUTPATIENT
Start: 2021-01-01 | End: 2021-01-01

## 2021-01-01 RX ORDER — CLONIDINE HYDROCHLORIDE 0.1 MG/1
0.1 TABLET ORAL EVERY 12 HOURS SCHEDULED
Status: DISCONTINUED | OUTPATIENT
Start: 2021-01-01 | End: 2021-01-01

## 2021-01-01 RX ORDER — LORAZEPAM 2 MG/ML
1 INJECTION INTRAMUSCULAR ONCE
Status: COMPLETED | OUTPATIENT
Start: 2021-01-01 | End: 2021-01-01

## 2021-01-01 RX ORDER — ONDANSETRON 2 MG/ML
4 INJECTION INTRAMUSCULAR; INTRAVENOUS EVERY 6 HOURS PRN
Status: DISCONTINUED | OUTPATIENT
Start: 2021-01-01 | End: 2021-01-01

## 2021-01-01 RX ORDER — LORAZEPAM 2 MG/ML
1 INJECTION INTRAMUSCULAR
Status: DISCONTINUED | OUTPATIENT
Start: 2021-01-01 | End: 2021-01-01 | Stop reason: HOSPADM

## 2021-01-01 RX ORDER — RISPERIDONE 0.25 MG/1
0.5 TABLET ORAL EVERY 12 HOURS SCHEDULED
Status: DISCONTINUED | OUTPATIENT
Start: 2021-01-01 | End: 2021-01-01

## 2021-01-01 RX ORDER — LORAZEPAM 1 MG/1
1 TABLET ORAL EVERY 12 HOURS SCHEDULED
Status: DISCONTINUED | OUTPATIENT
Start: 2021-01-01 | End: 2021-01-01

## 2021-01-01 RX ORDER — ASPIRIN 325 MG
325 TABLET ORAL DAILY
Status: DISCONTINUED | OUTPATIENT
Start: 2021-01-01 | End: 2021-01-01

## 2021-01-01 RX ORDER — ASPIRIN 81 MG/1
324 TABLET, CHEWABLE ORAL DAILY
Status: DISCONTINUED | OUTPATIENT
Start: 2021-01-01 | End: 2021-01-01

## 2021-01-01 RX ORDER — LORAZEPAM 2 MG/ML
INJECTION INTRAMUSCULAR
Status: COMPLETED
Start: 2021-01-01 | End: 2021-01-01

## 2021-01-01 RX ORDER — ASPIRIN 300 MG/1
300 SUPPOSITORY RECTAL DAILY
Status: DISCONTINUED | OUTPATIENT
Start: 2021-01-01 | End: 2021-01-01

## 2021-01-01 RX ORDER — RISPERIDONE 0.5 MG/1
0.5 TABLET ORAL DAILY
COMMUNITY

## 2021-01-01 RX ORDER — SODIUM CHLORIDE 0.9 % (FLUSH) 0.9 %
3-10 SYRINGE (ML) INJECTION AS NEEDED
Status: DISCONTINUED | OUTPATIENT
Start: 2021-01-01 | End: 2021-01-01

## 2021-01-01 RX ORDER — CLONIDINE HYDROCHLORIDE 0.1 MG/1
0.1 TABLET ORAL ONCE
Status: DISCONTINUED | OUTPATIENT
Start: 2021-01-01 | End: 2021-01-01

## 2021-01-01 RX ORDER — ATORVASTATIN CALCIUM 40 MG/1
40 TABLET, FILM COATED ORAL NIGHTLY
Status: DISCONTINUED | OUTPATIENT
Start: 2021-01-01 | End: 2021-01-01

## 2021-01-01 RX ORDER — POTASSIUM CHLORIDE 1.5 G/1.77G
40 POWDER, FOR SOLUTION ORAL AS NEEDED
Status: DISCONTINUED | OUTPATIENT
Start: 2021-01-01 | End: 2021-01-01 | Stop reason: SDUPTHER

## 2021-01-01 RX ORDER — RISPERIDONE 0.25 MG/1
0.5 TABLET ORAL DAILY
Status: DISCONTINUED | OUTPATIENT
Start: 2021-01-01 | End: 2021-01-01

## 2021-01-01 RX ORDER — LORAZEPAM 2 MG/ML
0.5 INJECTION INTRAMUSCULAR ONCE
Status: COMPLETED | OUTPATIENT
Start: 2021-01-01 | End: 2021-01-01

## 2021-01-01 RX ORDER — RISPERIDONE 0.5 MG/1
0.5 TABLET, ORALLY DISINTEGRATING ORAL 2 TIMES DAILY
Status: DISCONTINUED | OUTPATIENT
Start: 2021-01-01 | End: 2021-01-01

## 2021-01-01 RX ORDER — POTASSIUM CHLORIDE 20 MEQ/1
40 TABLET, EXTENDED RELEASE ORAL AS NEEDED
Status: DISCONTINUED | OUTPATIENT
Start: 2021-01-01 | End: 2021-01-01

## 2021-01-01 RX ORDER — HYDRALAZINE HYDROCHLORIDE 20 MG/ML
10 INJECTION INTRAMUSCULAR; INTRAVENOUS EVERY 6 HOURS PRN
Status: DISCONTINUED | OUTPATIENT
Start: 2021-01-01 | End: 2021-01-01

## 2021-01-01 RX ORDER — LORAZEPAM 2 MG/ML
1 INJECTION INTRAMUSCULAR EVERY 4 HOURS PRN
Status: DISCONTINUED | OUTPATIENT
Start: 2021-01-01 | End: 2021-01-01

## 2021-01-01 RX ORDER — MORPHINE SULFATE 4 MG/ML
2 INJECTION, SOLUTION INTRAMUSCULAR; INTRAVENOUS
Status: DISCONTINUED | OUTPATIENT
Start: 2021-01-01 | End: 2021-01-01 | Stop reason: HOSPADM

## 2021-01-01 RX ORDER — POTASSIUM CHLORIDE 1.5 G/1.77G
40 POWDER, FOR SOLUTION ORAL AS NEEDED
Status: DISCONTINUED | OUTPATIENT
Start: 2021-01-01 | End: 2021-01-01

## 2021-01-01 RX ORDER — CARBOXYMETHYLCELLULOSE SODIUM 10 MG/ML
2 GEL OPHTHALMIC DAILY
Status: DISCONTINUED | OUTPATIENT
Start: 2021-01-01 | End: 2021-01-01

## 2021-01-01 RX ORDER — SODIUM CHLORIDE 0.9 % (FLUSH) 0.9 %
10 SYRINGE (ML) INJECTION AS NEEDED
Status: DISCONTINUED | OUTPATIENT
Start: 2021-01-01 | End: 2021-01-01

## 2021-01-01 RX ORDER — LEVETIRACETAM 250 MG/1
250 TABLET ORAL EVERY 12 HOURS SCHEDULED
Status: DISCONTINUED | OUTPATIENT
Start: 2021-01-01 | End: 2021-01-01

## 2021-01-01 RX ORDER — LORAZEPAM 1 MG/1
1 TABLET ORAL 2 TIMES DAILY
COMMUNITY

## 2021-01-01 RX ORDER — SODIUM CHLORIDE 9 MG/ML
75 INJECTION, SOLUTION INTRAVENOUS CONTINUOUS
Status: DISCONTINUED | OUTPATIENT
Start: 2021-01-01 | End: 2021-01-01

## 2021-01-01 RX ADMIN — CLONIDINE HYDROCHLORIDE 0.1 MG: 0.1 TABLET ORAL at 22:14

## 2021-01-01 RX ADMIN — LORAZEPAM 1 MG: 2 INJECTION INTRAMUSCULAR; INTRAVENOUS at 23:39

## 2021-01-01 RX ADMIN — LORAZEPAM 1 MG: 2 INJECTION INTRAMUSCULAR; INTRAVENOUS at 18:24

## 2021-01-01 RX ADMIN — CARBOXYMETHYLCELLULOSE SODIUM 2 DROP: 10 GEL OPHTHALMIC at 09:53

## 2021-01-01 RX ADMIN — Medication 3 ML: at 09:50

## 2021-01-01 RX ADMIN — POTASSIUM CHLORIDE 10 MEQ: 7.46 INJECTION, SOLUTION INTRAVENOUS at 23:44

## 2021-01-01 RX ADMIN — POTASSIUM CHLORIDE 10 MEQ: 7.46 INJECTION, SOLUTION INTRAVENOUS at 05:25

## 2021-01-01 RX ADMIN — LORAZEPAM 1 MG: 2 INJECTION INTRAMUSCULAR; INTRAVENOUS at 13:11

## 2021-01-01 RX ADMIN — SODIUM CHLORIDE 75 ML/HR: 9 INJECTION, SOLUTION INTRAVENOUS at 02:11

## 2021-01-01 RX ADMIN — SODIUM CHLORIDE 75 ML/HR: 9 INJECTION, SOLUTION INTRAVENOUS at 15:28

## 2021-01-01 RX ADMIN — HYDRALAZINE HYDROCHLORIDE 10 MG: 20 INJECTION INTRAMUSCULAR; INTRAVENOUS at 11:29

## 2021-01-01 RX ADMIN — HYDRALAZINE HYDROCHLORIDE 10 MG: 20 INJECTION INTRAMUSCULAR; INTRAVENOUS at 06:50

## 2021-01-01 RX ADMIN — MORPHINE SULFATE 2 MG: 4 INJECTION INTRAVENOUS at 23:39

## 2021-01-01 RX ADMIN — POTASSIUM CHLORIDE 10 MEQ: 7.46 INJECTION, SOLUTION INTRAVENOUS at 22:32

## 2021-01-01 RX ADMIN — HYDRALAZINE HYDROCHLORIDE 10 MG: 20 INJECTION INTRAMUSCULAR; INTRAVENOUS at 18:15

## 2021-01-01 RX ADMIN — Medication 3 ML: at 20:16

## 2021-01-01 RX ADMIN — LORAZEPAM 1 MG: 2 INJECTION INTRAMUSCULAR; INTRAVENOUS at 18:13

## 2021-01-01 RX ADMIN — Medication 3 ML: at 21:30

## 2021-01-01 RX ADMIN — RISPERIDONE 0.5 MG: 0.5 TABLET, ORALLY DISINTEGRATING ORAL at 01:43

## 2021-01-01 RX ADMIN — LORAZEPAM 1 MG: 2 INJECTION INTRAMUSCULAR; INTRAVENOUS at 02:11

## 2021-01-01 RX ADMIN — CLONIDINE HYDROCHLORIDE 0.1 MG: 0.1 TABLET ORAL at 09:53

## 2021-01-01 RX ADMIN — LORAZEPAM 1 MG: 2 INJECTION INTRAMUSCULAR; INTRAVENOUS at 21:29

## 2021-01-01 RX ADMIN — Medication 3 ML: at 21:35

## 2021-01-01 RX ADMIN — LORAZEPAM 1 MG: 2 INJECTION INTRAMUSCULAR; INTRAVENOUS at 21:56

## 2021-01-01 RX ADMIN — LORAZEPAM 1 MG: 2 INJECTION INTRAMUSCULAR; INTRAVENOUS at 02:10

## 2021-01-01 RX ADMIN — LORAZEPAM 1 MG: 2 INJECTION INTRAMUSCULAR; INTRAVENOUS at 00:37

## 2021-01-01 RX ADMIN — RISPERIDONE 0.5 MG: 0.25 TABLET ORAL at 09:50

## 2021-01-01 RX ADMIN — ASPIRIN 300 MG: 300 SUPPOSITORY RECTAL at 15:27

## 2021-01-01 RX ADMIN — LORAZEPAM 1 MG: 2 INJECTION INTRAMUSCULAR; INTRAVENOUS at 22:14

## 2021-01-01 RX ADMIN — LORAZEPAM 0.5 MG: 2 INJECTION INTRAMUSCULAR at 03:49

## 2021-01-01 RX ADMIN — POTASSIUM CHLORIDE 10 MEQ: 7.46 INJECTION, SOLUTION INTRAVENOUS at 17:24

## 2021-01-01 RX ADMIN — LORAZEPAM 1 MG: 2 INJECTION INTRAMUSCULAR; INTRAVENOUS at 14:11

## 2021-01-01 RX ADMIN — LORAZEPAM 1 MG: 2 INJECTION INTRAMUSCULAR; INTRAVENOUS at 18:26

## 2021-01-01 RX ADMIN — LORAZEPAM 1 MG: 2 INJECTION INTRAMUSCULAR; INTRAVENOUS at 09:50

## 2021-01-01 RX ADMIN — HYDRALAZINE HYDROCHLORIDE 10 MG: 20 INJECTION INTRAMUSCULAR; INTRAVENOUS at 23:43

## 2021-01-01 RX ADMIN — ASPIRIN 324 MG: 81 TABLET, CHEWABLE ORAL at 09:50

## 2021-01-01 RX ADMIN — LORAZEPAM 0.5 MG: 2 INJECTION INTRAMUSCULAR; INTRAVENOUS at 03:49

## 2021-01-01 RX ADMIN — LEVETIRACETAM 250 MG: 100 INJECTION, SOLUTION INTRAVENOUS at 21:36

## 2021-01-01 RX ADMIN — LEVETIRACETAM 250 MG: 100 INJECTION, SOLUTION INTRAVENOUS at 09:50

## 2021-01-01 RX ADMIN — HYDRALAZINE HYDROCHLORIDE 10 MG: 20 INJECTION INTRAMUSCULAR; INTRAVENOUS at 03:21

## 2021-01-01 RX ADMIN — POTASSIUM CHLORIDE 10 MEQ: 7.46 INJECTION, SOLUTION INTRAVENOUS at 15:28

## 2021-01-01 RX ADMIN — RISPERIDONE 0.5 MG: 0.25 TABLET ORAL at 21:34

## 2021-01-01 RX ADMIN — LORAZEPAM 1 MG: 2 INJECTION INTRAMUSCULAR; INTRAVENOUS at 04:46

## 2021-01-01 RX ADMIN — POTASSIUM CHLORIDE 10 MEQ: 7.46 INJECTION, SOLUTION INTRAVENOUS at 19:09

## 2021-01-01 RX ADMIN — SODIUM CHLORIDE 75 ML/HR: 9 INJECTION, SOLUTION INTRAVENOUS at 14:26

## 2021-01-01 RX ADMIN — HYDRALAZINE HYDROCHLORIDE 10 MG: 20 INJECTION INTRAMUSCULAR; INTRAVENOUS at 18:19

## 2021-01-01 RX ADMIN — MORPHINE SULFATE 2 MG: 4 INJECTION INTRAVENOUS at 21:29

## 2021-01-01 RX ADMIN — Medication 3 ML: at 10:57

## 2021-08-04 PROBLEM — R41.82 ALTERED MENTAL STATUS: Status: ACTIVE | Noted: 2021-01-01

## 2021-08-04 NOTE — DISCHARGE PLACEMENT REQUEST
"Doris Brenner (86 y.o. Female)     Date of Birth Social Security Number Address Home Phone MRN    [id number]  [id number] [place] [facility] [place] [address] [phone] [id number]    Christianity Marital Status          Lutheran        Admission Date Admission Type Admitting Provider Attending Provider Department, Room/Bed    8/4/21 Emergency Ivana Fam MD Hubert, Shon T, MD [facility] EMERGENCY DEPARTMENT, 02/02    Discharge Date Discharge Disposition Discharge Destination                       Attending Provider: Luis Flood MD    Allergies: No Known Allergies    Isolation: None   Infection: None   Code Status: Not on file    Ht: 165.1 cm (65\")   Wt: 43.1 kg (95 lb)    Admission Cmt: None   Principal Problem: None                Active Insurance as of 8/4/2021     Primary Coverage     Payor Plan Insurance Group Employer/Plan Group    MEDICARE MEDICARE A & B      Payor Plan Address Payor Plan Phone Number Payor Plan Fax Number Effective Dates    [address] [phone]  5/1/2000 - None Entered    [facility] [id number]       Subscriber Name Subscriber Birth Date Member ID       DORIS BRENNER 1935 [id number]           Secondary Coverage     Payor Plan Insurance Group Employer/Plan Group    [employer]HE[employer] BLUE CROSS ANTHEM BLUE CROSS BLUE SHIELD PPO [id number]     Payor Plan Address Payor Plan Phone Number Payor Plan Fax Number Effective Dates    [address] [phone]  1/1/2012 - None Entered    [facility] [id number]       Subscriber Name Subscriber Birth Date Member ID       TUYET GODINEZ 1935 [id number]                 Emergency Contacts      (Rel.) Home Phone Work Phone Mobile Phone    TUYET BRENNER (Spouse) [phone] -- --    LOLA LUNSFORD (Son) [phone] -- [phone]            Maggy Cheng RN, [facility]  Office: [phone]  Fax: [phone]  [email or web address]  "

## 2021-08-04 NOTE — H&P
Patient Care Team:  Ahmet Lagos MD as PCP - General (Family Medicine)    Chief complaint altered mental status    Subjective     86-year-old WF with dementia was brought in by EMS for altered mental status from [facility].  According to family at bedside (son and ), she has been in the facility only 2 weeks.  She was found on the floor this am with a low blood pressure.  She was at her baseline mental status 2 days ago.  She has been restless and c/o knee pain.  Labs are unremarkable.  CT scan does not show any acute changes     Review of Systems   Unable to perform ROS: Dementia          History  Past Medical History:   Diagnosis Date   • Hypertension    • Stroke (CMS/HCC)      Past Surgical History:   Procedure Laterality Date   • HYSTERECTOMY       No family history on file.  Social History     Tobacco Use   • Smoking status: Never Smoker   • Smokeless tobacco: Never Used   Substance Use Topics   • Alcohol use: Yes     Alcohol/week: 14.0 standard drinks     Types: 14 Glasses of wine per week   • Drug use: No     (Not in a hospital admission)    Allergies:  Patient has no known allergies.    Objective     Vital Signs  Temp:  [98.8 °F (37.1 °C)-100.2 °F (37.9 °C)] 99.1 °F (37.3 °C)  Heart Rate:  [44-84] 47  Resp:  [14-18] 18  BP: (124-176)/(36-86) 165/36     Physical Exam:      General Appearance:    Awake.  Very restless in bed.  Attempts to follow simple commands   Head:    Normocephalic, without obvious abnormality, atraumatic   Eyes:            Lids and lashes normal, conjunctivae and sclerae normal, no   icterus, no pallor, corneas clear, PERRLA   Ears:    Ears appear intact with no abnormalities noted   Throat:   No oral lesions, no thrush, oral mucosa moist   Neck:   No adenopathy, supple, trachea midline, no thyromegaly, no   carotid bruit, no JVD   Lungs:     Clear to auscultation,respirations regular, even and                  unlabored    Heart:    Regular rhythm and normal rate, normal S1  and S2, no            murmur, no gallop, no rub, no click   Chest Wall:    No abnormalities observed   Abdomen:     Normal bowel sounds, no masses, no organomegaly, soft        non-tender, non-distended, no guarding, no rebound                tenderness   Extremities:   Moves all extremities well, no edema, no cyanosis, no             redness   Pulses:   Pulses palpable and equal bilaterally   Skin:   No bleeding, bruising or rash   Lymph nodes:   No palpable adenopathy   Neurologic:   Cranial nerves 2 - 12 grossly intact, sensation intact, DTR       present and equal bilaterally. She has generalized weakness       Results Review:     Imaging Results (Last 24 Hours)     Procedure Component Value Units Date/Time    XR Chest 1 View [[id number]] Collected: 08/04/21 0723     Updated: 08/04/21 0726    Narrative:         DATE OF EXAM:   8/4/2021 2:25 AM     PROCEDURE:   XR CHEST 1 VW-     INDICATIONS:   altered mental status     COMPARISON:  No Comparisons Available     TECHNIQUE:   Portable chest radiograph.     FINDINGS:    Heart is enlarged. There is no focal consolidation, pneumothorax, or  significant pleural effusion. Left midlung granulomas. Aortic arch  atherosclerosis. Osseous structures grossly intact.       Impression:         1. Cardiomegaly.  2. No focal consolidation.     Electronically Signed By-Jaycob Villarreal MD On:8/4/2021 7:24 AM  This report was finalized on [id number] by  Jaycob Villarreal MD.    CT Head Without Contrast [[id number]] Collected: 08/04/21 0325     Updated: 08/04/21 0329    Narrative:      EXAMINATION: CT HEAD WITHOUT CONTRAST    DATE: 8/4/2021 3:06 AM     INDICATION: Altered mental status.     COMPARISON: 11/17/2019     TECHNIQUE:  Routine axial images through the head without contrast. Coronal reformations.    Low-dose CT acquisition technique included one or more of the following options: 1. Automated exposure control, 2. Adjustment of mA and/or KV according to patient's size and/or 3. Use  of iterative reconstruction.    FINDINGS:      Intracranial contents:    Ventricular and cisternal spaces are prominent from volume loss. Severe periventricular and subcortical white matter hypodensities. Hypodensities in the basal ganglia from old lacunar type infarcts. Encephalomalacia right posterior, medial temporal lobe   and right occipital lobe measures 3.3 x 8.9 cm. Encephalomalacia left anterior temporal lobe measures 1.1 x 1.3 cm. Hypodensities in the thalami from old lacunar type infarcts. No dominant mass, midline shift, hydrocephalus, extra axial fluid collection   or acute hemorrhage.    No asymmetric hyperdensity of the proximal major cerebral arteries.    Craniocervical junction is patent.    Bones and extracranial soft tissues:    Mild mucosal thickening ethmoid air cells, maxillary sinuses. Otherwise, Visualized paranasal sinuses and mastoid air cells are clear.  Skull is intact. Cataract surgery. Calcifications distal internal carotid arteries and distal vertebral arteries.   Degenerative changes temporomandibular joints.      Impression:      1. No acute intracranial process. MRI is more sensitive for the detection of acute nonhemorrhagic infarct.  2. Severe changes small vessel ischemic disease of indeterminate age, presumably mostly chronic. Volume loss. Atherosclerosis. Old infarct right posterior cerebral artery territory.    Electronically signed by:  Javier Black M.D.    8/4/2021 1:28 AM           Lab Results (last 24 hours)     Procedure Component Value Units Date/Time    Urinalysis With Culture If Indicated - Urine, Catheter In/Out [[id number]]  (Normal) Collected: 08/04/21 0211    Specimen: Urine, Catheter In/Out Updated: 08/04/21 0301     Color, UA Yellow     Appearance, UA Clear     pH, UA 6.5     Specific Gravity, UA 1.018     Glucose, UA Negative     Ketones, UA Negative     Bilirubin, UA Negative     Blood, UA Negative     Protein, UA Negative     Leuk Esterase, UA Negative      Nitrite, UA Negative     Urobilinogen, UA 0.2 E.U./dL    Narrative:      Urine microscopic not indicated.    [place] Draw [[id number]] Collected: 08/04/21 0154    Specimen: Blood Updated: 08/04/21 0301    Narrative:      The following orders were created for panel order [place] Draw.  Procedure                               Abnormality         Status                     ---------                               -----------         ------                     Green Top (Gel)[[id number]]                                  Final result               Lavender Top[[id number]]                                     Final result               Gold Top - SST[[id number]]                                   Final result                 Please view results for these tests on the individual orders.    Green Top (Gel) [[id number]] Collected: 08/04/21 0154    Specimen: Blood Updated: 08/04/21 0301     Extra Tube Hold for add-ons.     Comment: Auto resulted.       Lavender Top [[id number]] Collected: 08/04/21 0154    Specimen: Blood Updated: 08/04/21 0301     Extra Tube hold for add-on     Comment: Auto resulted       Gold Top - SST [[id number]] Collected: 08/04/21 0154    Specimen: Blood Updated: 08/04/21 0301     Extra Tube Hold for add-ons.     Comment: Auto resulted.       Protime-INR [[id number]]  (Normal) Collected: 08/04/21 0154    Specimen: Blood Updated: 08/04/21 0227     Protime 11.4 Seconds      INR 1.03    aPTT [[id number]]  (Normal) Collected: 08/04/21 0154    Specimen: Blood Updated: 08/04/21 0227     PTT 26.2 seconds     Comprehensive Metabolic Panel [[id number]]  (Abnormal) Collected: 08/04/21 0154    Specimen: Blood Updated: 08/04/21 0219     Glucose 109 mg/dL      BUN 15 mg/dL      Creatinine 0.73 mg/dL      Sodium 137 mmol/L      Potassium 3.6 mmol/L      Chloride 101 mmol/L      CO2 26.0 mmol/L      Calcium 8.8 mg/dL      Total Protein 6.0 g/dL      Albumin 3.60 g/dL      ALT (SGPT) 17 U/L      AST (SGOT) 49 U/L      Alkaline  Phosphatase 51 U/L      Total Bilirubin 0.8 mg/dL      eGFR Non African Amer 76 mL/min/1.73      Globulin 2.4 gm/dL      A/G Ratio 1.5 g/dL      BUN/Creatinine Ratio 20.5     Anion Gap 10.0 mmol/L     Narrative:      GFR Normal >60  Chronic Kidney Disease <60  Kidney Failure <15      Lipase [[id number]]  (Normal) Collected: 08/04/21 0154    Specimen: Blood Updated: 08/04/21 0219     Lipase 31 U/L     Troponin [[id number]]  (Normal) Collected: 08/04/21 0154    Specimen: Blood Updated: 08/04/21 0219     Troponin T 0.021 ng/mL     Narrative:      Troponin T Reference Range:  <= 0.03 ng/mL-   Negative for AMI  >0.03 ng/mL-     Abnormal for myocardial necrosis.  Clinicians would have to utilize clinical acumen, EKG, Troponin and serial changes to determine if it is an Acute Myocardial Infarction or myocardial injury due to an underlying chronic condition.       Results may be falsely decreased if patient taking Biotin.      CBC & Differential [[id number]]  (Abnormal) Collected: 08/04/21 0154    Specimen: Blood Updated: 08/04/21 0202    Narrative:      The following orders were created for panel order CBC & Differential.  Procedure                               Abnormality         Status                     ---------                               -----------         ------                     CBC Auto Differential[[id number]]        Abnormal            Final result                 Please view results for these tests on the individual orders.    CBC Auto Differential [[id number]]  (Abnormal) Collected: 08/04/21 0154    Specimen: Blood Updated: 08/04/21 0202     WBC 7.90 10*3/mm3      RBC 4.24 10*6/mm3      Hemoglobin 14.0 g/dL      Hematocrit 40.3 %      MCV 95.2 fL      MCH 33.1 pg      MCHC 34.8 g/dL      RDW 13.7 %      RDW-SD 45.5 fl      MPV 8.5 fL      Platelets 229 10*3/mm3      Neutrophil % 73.0 %      Lymphocyte % 15.0 %      Monocyte % 10.3 %      Eosinophil % 0.7 %      Basophil % 1.0 %      Neutrophils, Absolute  5.80 10*3/mm3      Lymphocytes, Absolute 1.20 10*3/mm3      Monocytes, Absolute 0.80 10*3/mm3      Eosinophils, Absolute 0.10 10*3/mm3      Basophils, Absolute 0.10 10*3/mm3      nRBC 0.1 /100 WBC            I reviewed the patient's new clinical results.    Assessment/Plan       Altered mental status  - admit for observation.  Work up thus far has been unremarkable.  Will check MRI and consult neurology.  Repeat labs in am    Dementia - continue home meds and ativan for restlessness prn    HTN - pt is not on any home meds.  Will add hydralazine prn    I discussed the patients findings and my recommendations with patient.     Nguyen Berry MD  08/04/21  12:32 EDT

## 2021-08-04 NOTE — ED NOTES
Pt brought in by EMS from [facility] for extreme lethargy. Report EMS got from [facility] is that patient is usually A&Ox4 and ambulates by self but for the last two days patient has been hard to arouses and has been sleeping more than usual. Pt is unable to answers question and does not respond unless there is repeat stimulation.         Amirah Guerin, LPN  08/04/21 0229

## 2021-08-04 NOTE — CONSULTS
Primary Care Provider: Ahmet Lagos MD     Consult requested by: Dr Berry  Reason for Consultation: Neurological evaluation/mental status changes    History taken from: chart family RN    Chief complaint: Worsening mentation     SUBJECTIVE:    History of present illness:   The patient is an 86-year-old apparently right-handed white female who was evaluated in room 2 in the ER at [facility]    Social information is the patient's family, her  and son who are bedside and also Dr. Berry who was present and the medical records    Apparently she has history of dementia and over the last about 2 weeks has been placed into [facility] and she has not been herself in the last evening she got worse.  She got more confused.  Minimal interaction  Nothing focal  Labs do not look really bad  She had a temp of 102 1 time and she has been bradycardic and blood pressure has been kind of running a bit low at times and high 160s and 170s at other times    No neck stiffness  No meningismus  No seizures    She is interacting and she can name and she can follow some simple commands, gave me thumbs up, moves all extremities but not the most cooperative patient    No history of seizure or status or focal changes  Nothing suggesting large stroke    No real change in medication known to me    As per primary,86-year-old WF with dementia was brought in by EMS for altered mental status from [facility].  According to family at bedside (son and ), she has been in the facility only 2 weeks.  She was found on the floor this am with a low blood pressure.  She was at her baseline mental status 2 days ago.  She has been restless and c/o knee pain.  Labs are unremarkable.  CT scan does not show any acute changes ,       as per ER team,86-year-old female presents by EMS for altered mental status.  Patient apparently normally gets around on her own at home but over the past 2 days has been staying in bed and has been  poorly responsive.  Patient is awake but is keeping her eyes closed.  She will not answer any questions.  She will follow some simple commands.  No additional history can be obtained at this time.     Review of Systems   Not possible because of her present condition      PATIENT HISTORY:  Past Medical History:   Diagnosis Date   • Hypertension    • Stroke (CMS/HCC)    ,   Past Surgical History:   Procedure Laterality Date   • HYSTERECTOMY     , No family history on file.,   Social History     Tobacco Use   • Smoking status: Never Smoker   • Smokeless tobacco: Never Used   Substance Use Topics   • Alcohol use: Yes     Alcohol/week: 14.0 standard drinks     Types: 14 Glasses of wine per week   • Drug use: No   , (Not in a hospital admission)  , Scheduled Meds:  carboxymethylcellulose sod, 2 drop, Both Eyes, Daily  LORazepam, 1 mg, Oral, Q12H  risperiDONE, 0.5 mg, Oral, Daily  senna, 1 tablet, Oral, Daily  sodium chloride, 3 mL, Intravenous, Q12H    , Continuous Infusions:   , PRN Meds:  hydrALAZINE  •  LORazepam  •  melatonin  •  ondansetron  •  sodium chloride  •  [COMPLETED] Insert peripheral IV **AND** sodium chloride  •  sodium chloride, Allergies:  Patient has no known allergies.    ________________________________________________________        OBJECTIVE:  Upon today's exam, patient is very restless, despite given Ativan she is quite restless      Neurologic Exam  Limited neurological examination, the patient is awake, she would open her eyes, was able to identify, follow some simple commands, gave me thumbs up.  She is not oriented to time.  She was oriented to her family members    Cranial nerve screening she could see but feels aphasia otherwise I cannot say anything, no forceful eye deviation, pupils are about 2 mm sluggish but reactive eye movements are conjugate, no ptosis, no nystagmus, funduscopic damage were not successful, I did not see any facial asymmetry and hearing seem to be intact  Tongue was  midline but I could not visualize her oropharynx or uvula  Head turning was spontaneous no neck stiffness    On motor examination she is moving all extremities and withdraws strength of at least 4+    Since examination respond to pain and withdraws  Romberg could not be evaluated  Extinction is questionable    I cannot get any reflexes and toes are mute  She was able to do finger-to-nose but beyond that did not do much    Gait examination deferred and coordination otherwise questionable  ________________________________________________________   RESULTS REVIEW:    VITAL SIGNS:   Temp:  [98.8 °F (37.1 °C)-100.2 °F (37.9 °C)] 99.1 °F (37.3 °C)  Heart Rate:  [44-84] 47  Resp:  [14-18] 18  BP: (124-176)/(36-86) 165/36     LABS:  WBC   Date Value Ref Range Status   08/04/2021 7.90 3.40 - 10.80 10*3/mm3 Final     RBC   Date Value Ref Range Status   08/04/2021 4.24 3.77 - 5.28 10*6/mm3 Final     Hemoglobin   Date Value Ref Range Status   08/04/2021 14.0 12.0 - 15.9 g/dL Final     Hematocrit   Date Value Ref Range Status   08/04/2021 40.3 34.0 - 46.6 % Final     MCV   Date Value Ref Range Status   08/04/2021 95.2 79.0 - 97.0 fL Final     MCH   Date Value Ref Range Status   08/04/2021 33.1 (H) 26.6 - 33.0 pg Final     MCHC   Date Value Ref Range Status   08/04/2021 34.8 31.5 - 35.7 g/dL Final     RDW   Date Value Ref Range Status   08/04/2021 13.7 12.3 - 15.4 % Final     RDW-SD   Date Value Ref Range Status   08/04/2021 45.5 37.0 - 54.0 fl Final     MPV   Date Value Ref Range Status   08/04/2021 8.5 6.0 - 12.0 fL Final     Platelets   Date Value Ref Range Status   08/04/2021 229 140 - 450 10*3/mm3 Final     Neutrophil %   Date Value Ref Range Status   08/04/2021 73.0 42.7 - 76.0 % Final     Lymphocyte %   Date Value Ref Range Status   08/04/2021 15.0 (L) 19.6 - 45.3 % Final     Monocyte %   Date Value Ref Range Status   08/04/2021 10.3 5.0 - 12.0 % Final     Eosinophil %   Date Value Ref Range Status   08/04/2021 0.7 0.3 -  6.2 % Final     Basophil %   Date Value Ref Range Status   08/04/2021 1.0 0.0 - 1.5 % Final     Neutrophils, Absolute   Date Value Ref Range Status   08/04/2021 5.80 1.70 - 7.00 10*3/mm3 Final     Lymphocytes, Absolute   Date Value Ref Range Status   08/04/2021 1.20 0.70 - 3.10 10*3/mm3 Final     Monocytes, Absolute   Date Value Ref Range Status   08/04/2021 0.80 0.10 - 0.90 10*3/mm3 Final     Eosinophils, Absolute   Date Value Ref Range Status   08/04/2021 0.10 0.00 - 0.40 10*3/mm3 Final     Basophils, Absolute   Date Value Ref Range Status   08/04/2021 0.10 0.00 - 0.20 10*3/mm3 Final     nRBC   Date Value Ref Range Status   08/04/2021 0.1 0.0 - 0.2 /100 WBC Final     Glucose   Date Value Ref Range Status   08/04/2021 109 (H) 65 - 99 mg/dL Final     BUN   Date Value Ref Range Status   08/04/2021 15 8 - 23 mg/dL Final     Creatinine   Date Value Ref Range Status   08/04/2021 0.73 0.57 - 1.00 mg/dL Final     Sodium   Date Value Ref Range Status   08/04/2021 137 136 - 145 mmol/L Final     Potassium   Date Value Ref Range Status   08/04/2021 3.6 3.5 - 5.2 mmol/L Final     Chloride   Date Value Ref Range Status   08/04/2021 101 98 - 107 mmol/L Final     CO2   Date Value Ref Range Status   08/04/2021 26.0 22.0 - 29.0 mmol/L Final     Calcium   Date Value Ref Range Status   08/04/2021 8.8 8.6 - 10.5 mg/dL Final     Total Protein   Date Value Ref Range Status   08/04/2021 6.0 6.0 - 8.5 g/dL Final     Albumin   Date Value Ref Range Status   08/04/2021 3.60 3.50 - 5.20 g/dL Final     ALT (SGPT)   Date Value Ref Range Status   08/04/2021 17 1 - 33 U/L Final     AST (SGOT)   Date Value Ref Range Status   08/04/2021 49 (H) 1 - 32 U/L Final     Alkaline Phosphatase   Date Value Ref Range Status   08/04/2021 51 39 - 117 U/L Final     Total Bilirubin   Date Value Ref Range Status   08/04/2021 0.8 0.0 - 1.2 mg/dL Final     eGFR Non  Amer   Date Value Ref Range Status   08/04/2021 76 >60 mL/min/1.73 Final     BUN/Creatinine  Ratio   Date Value Ref Range Status   08/04/2021 20.5 7.0 - 25.0 Final     Anion Gap   Date Value Ref Range Status   08/04/2021 10.0 5.0 - 15.0 mmol/L Final       Lab Results   Component Value Date    TSH 1.570 02/19/2020     (H) 02/19/2020    HGBA1C 5.4 08/03/2019         IMAGING STUDIES:  CT Head Without Contrast    Result Date: 8/4/2021  1. No acute intracranial process. MRI is more sensitive for the detection of acute nonhemorrhagic infarct. 2. Severe changes small vessel ischemic disease of indeterminate age, presumably mostly chronic. Volume loss. Atherosclerosis. Old infarct right posterior cerebral artery territory. Electronically signed by:  Javier Black M.D.  8/4/2021 1:28 AM    XR Chest 1 View    Result Date: 8/4/2021   1. Cardiomegaly. 2. No focal consolidation.  Electronically Signed By-Jaycob Villarreal MD On:8/4/2021 7:24 AM This report was finalized on [id number] by  Jaycob Villarreal MD.      I reviewed the patient's new clinical results.      ________________________________________________________     PROBLEM LIST:    Altered mental status          Assessment/Plan   ASSESSMENT/PLAN:  Delirium on top of dementia  Its not uncommon when the patient have to change of venue/residents that the dementia and delirium gets worse  And not see anything focal  She does not look like she is having large stroke, CNS infection or seizure  If the MRI is okay we will continue supportive care        I discussed the patient's findings and my recommendations with family, nursing staff and primary care team    Nathalia Razo MD  08/04/21  14:16 EDT

## 2021-08-04 NOTE — ED NOTES
Patient sleeping on stretcher. No distress noted. Call light within reach. Family at bedside and updated.     Travis Sherman, RN  08/04/21 1025       Travis Sherman, RN  08/04/21 1033

## 2021-08-04 NOTE — ED PROVIDER NOTES
"Subjective   Chief complaint: Altered mental status    86-year-old female presents by EMS for altered mental status.  Patient apparently normally gets around on her own at home but over the past 2 days has been staying in bed and has been poorly responsive.  Patient is awake but is keeping her eyes closed.  She will not answer any questions.  She will follow some simple commands.  No additional history can be obtained at this time.      History provided by:  EMS personnel      Review of Systems   Unable to perform ROS: Mental status change       Past Medical History:   Diagnosis Date   • Hypertension    • Stroke (CMS/HCC)        No Known Allergies    Past Surgical History:   Procedure Laterality Date   • HYSTERECTOMY         No family history on file.    Social History     Socioeconomic History   • Marital status:      Spouse name: Not on file   • Number of children: Not on file   • Years of education: Not on file   • Highest education level: Not on file   Tobacco Use   • Smoking status: Never Smoker   • Smokeless tobacco: Never Used   Substance and Sexual Activity   • Alcohol use: Yes     Alcohol/week: 14.0 standard drinks     Types: 14 Glasses of wine per week   • Drug use: No   • Sexual activity: Defer       /60   Pulse 84   Temp 100.2 °F (37.9 °C) (Rectal)   Resp 16   Ht 165.1 cm (65\")   Wt 43.1 kg (95 lb)   SpO2 96%   BMI 15.81 kg/m²       Objective   Physical Exam  Vitals and nursing note reviewed.   Constitutional:       Appearance: She is well-developed.   HENT:      Head: Normocephalic and atraumatic.   Eyes:      Pupils: Pupils are equal, round, and reactive to light.   Cardiovascular:      Rate and Rhythm: Normal rate and regular rhythm.      Heart sounds: Normal heart sounds.   Pulmonary:      Effort: Pulmonary effort is normal. No respiratory distress.      Breath sounds: Normal breath sounds.   Abdominal:      General: Bowel sounds are normal.      Palpations: Abdomen is soft.      " Tenderness: There is no abdominal tenderness.   Musculoskeletal:         General: No tenderness, deformity or signs of injury.      Cervical back: Normal range of motion and neck supple.   Skin:     General: Skin is warm and dry.   Neurological:      Mental Status: She is alert.      Comments: General weakness with no obvious focal motor or sensory deficit.  Will not answer questions.         Procedures           ED Course      Results for orders placed or performed during the hospital encounter of 08/04/21   Comprehensive Metabolic Panel    Specimen: Blood   Result Value Ref Range    Glucose 109 (H) 65 - 99 mg/dL    BUN 15 8 - 23 mg/dL    Creatinine 0.73 0.57 - 1.00 mg/dL    Sodium 137 136 - 145 mmol/L    Potassium 3.6 3.5 - 5.2 mmol/L    Chloride 101 98 - 107 mmol/L    CO2 26.0 22.0 - 29.0 mmol/L    Calcium 8.8 8.6 - 10.5 mg/dL    Total Protein 6.0 6.0 - 8.5 g/dL    Albumin 3.60 3.50 - 5.20 g/dL    ALT (SGPT) 17 1 - 33 U/L    AST (SGOT) 49 (H) 1 - 32 U/L    Alkaline Phosphatase 51 39 - 117 U/L    Total Bilirubin 0.8 0.0 - 1.2 mg/dL    eGFR Non African Amer 76 >60 mL/min/1.73    Globulin 2.4 gm/dL    A/G Ratio 1.5 g/dL    BUN/Creatinine Ratio 20.5 7.0 - 25.0    Anion Gap 10.0 5.0 - 15.0 mmol/L   Protime-INR    Specimen: Blood   Result Value Ref Range    Protime 11.4 9.6 - 11.7 Seconds    INR 1.03 0.93 - 1.10   aPTT    Specimen: Blood   Result Value Ref Range    PTT 26.2 24.0 - 31.0 seconds   Lipase    Specimen: Blood   Result Value Ref Range    Lipase 31 13 - 60 U/L   Urinalysis With Culture If Indicated - Urine, Catheter In/Out    Specimen: Urine, Catheter In/Out   Result Value Ref Range    Color, UA Yellow Yellow, Straw    Appearance, UA Clear Clear    pH, UA 6.5 5.0 - 8.0    Specific Gravity, UA 1.018 1.005 - 1.030    Glucose, UA Negative Negative    Ketones, UA Negative Negative    Bilirubin, UA Negative Negative    Blood, UA Negative Negative    Protein, UA Negative Negative    Leuk Esterase, UA Negative  Negative    Nitrite, UA Negative Negative    Urobilinogen, UA 0.2 E.U./dL 0.2 - 1.0 E.U./dL   Troponin    Specimen: Blood   Result Value Ref Range    Troponin T 0.021 0.000 - 0.030 ng/mL   CBC Auto Differential    Specimen: Blood   Result Value Ref Range    WBC 7.90 3.40 - 10.80 10*3/mm3    RBC 4.24 3.77 - 5.28 10*6/mm3    Hemoglobin 14.0 12.0 - 15.9 g/dL    Hematocrit 40.3 34.0 - 46.6 %    MCV 95.2 79.0 - 97.0 fL    MCH 33.1 (H) 26.6 - 33.0 pg    MCHC 34.8 31.5 - 35.7 g/dL    RDW 13.7 12.3 - 15.4 %    RDW-SD 45.5 37.0 - 54.0 fl    MPV 8.5 6.0 - 12.0 fL    Platelets 229 140 - 450 10*3/mm3    Neutrophil % 73.0 42.7 - 76.0 %    Lymphocyte % 15.0 (L) 19.6 - 45.3 %    Monocyte % 10.3 5.0 - 12.0 %    Eosinophil % 0.7 0.3 - 6.2 %    Basophil % 1.0 0.0 - 1.5 %    Neutrophils, Absolute 5.80 1.70 - 7.00 10*3/mm3    Lymphocytes, Absolute 1.20 0.70 - 3.10 10*3/mm3    Monocytes, Absolute 0.80 0.10 - 0.90 10*3/mm3    Eosinophils, Absolute 0.10 0.00 - 0.40 10*3/mm3    Basophils, Absolute 0.10 0.00 - 0.20 10*3/mm3    nRBC 0.1 0.0 - 0.2 /100 WBC   ECG 12 Lead   Result Value Ref Range    QT Interval 498 ms   Green Top (Gel)   Result Value Ref Range    Extra Tube Hold for add-ons.    Lavender Top   Result Value Ref Range    Extra Tube hold for add-on    Gold Top - SST   Result Value Ref Range    Extra Tube Hold for add-ons.      CT Head Without Contrast   Final Result   1. No acute intracranial process. MRI is more sensitive for the detection of acute nonhemorrhagic infarct.   2. Severe changes small vessel ischemic disease of indeterminate age, presumably mostly chronic. Volume loss. Atherosclerosis. Old infarct right posterior cerebral artery territory.      Electronically signed by:  Javier Black M.D.     8/4/2021 1:28 AM      XR Chest 1 View   ED Interpretation   No acute disease             My interpretation of EKG shows sinus bradycardia, rate of 49, right bundle branch block, no ST elevation                                 MDM   Patient had the above evaluation.  Work-up has been fairly unremarkable.  CT head showed no acute abnormality.  Chest x-ray shows no acute disease.  EKG shows no acute ischemia.  Troponin is negative.  Urinalysis shows no UTI.  No obvious source of altered mental status has been identified.  Patient did become more responsive while in the emergency room although she is still quite confused.  I discussed with the on-call primary doctor and the patient will be admitted for further evaluation and management.      Final diagnoses:   Altered mental status, unspecified altered mental status type       ED Disposition  ED Disposition     ED Disposition Condition Comment    Decision to Admit  Level of Care: Telemetry [5]   Admitting Physician: JULIAN CHAVEZ [[id number]]   Bed Request Comments: TIERRA            No follow-up provider specified.       Medication List      No changes were made to your prescriptions during this visit.          Luis Flood MD  08/04/21 8272

## 2021-08-04 NOTE — ED NOTES
Patient incontinent of bladder. Patient cleaned up and new pads, sheets and blankets applied. Family at bedside.     Travis Sherman RN  08/04/21 3792

## 2021-08-04 NOTE — CASE MANAGEMENT/SOCIAL WORK
"Discharge Planning Assessment  [facility]     Patient Name: Doris Brenner  MRN: [id number]  Today's Date: 8/4/2021    Admit Date: 8/4/2021    Discharge Needs Assessment    No documentation.       Discharge Plan     Row Name 08/04/21 1833       Plan    Plan  [facility]    Plan Comments  Per Mouna, with [facility], pt can return at [place]    Row Name 08/04/21 1821       Plan    Plan  Return to [facility]    Plan Comments  Plan needs confirmed with pt's spouse and/or son.Patient's granddaughter, Kacy is at bedside.She states plan is for patient  to return to [facility] at [place].She reports pt's son, Reynaldo Muniz is pt's POA, but I haven't confirmed this.Kacy reports Reynaldo is currently unavailable due to he is \"teaching a class\"        Continued Care and Services - Admitted Since 8/4/2021     Destination     Service Provider Request Status Selected Services Address Phone Fax Patient Preferred    [facility] [place]  Accepted N/A [address] [place] [address] [phone] --                Demographic Summary     Row Name 08/04/21 1818       General Information    Admission Type  observation    Required Notices Provided  Observation Status Notice Noted in media tab of patient's epic chart. Signed by her spouse 8/4/2 @ 4661    Referral Source  admission list;high risk screening    Reason for Consult  discharge planning     Used During This Interaction  other (see comments)    General Information Comments  Due to patient's confusion, I spoke with her granddaughter Kacy who is at bedside.She confirms patient is from [facility] and plan is for her to return. Will need confirmed with patient's spouse/son        Maggy Cheng RN, [facility]  Office: [phone]  Fax: [phone]  [email or web address]    Maggy Cheng RN    "

## 2021-08-05 NOTE — PLAN OF CARE
Problem: Adult Inpatient Plan of Care  Goal: Plan of Care Review  Outcome: Ongoing, Progressing  Goal: Patient-Specific Goal (Individualized)  Outcome: Ongoing, Progressing  Goal: Absence of Hospital-Acquired Illness or Injury  Outcome: Ongoing, Progressing  Intervention: Identify and Manage Fall Risk  Recent Flowsheet Documentation  Taken 8/5/2021 1620 by Marsha Baldwin RN  Safety Promotion/Fall Prevention:   safety round/check completed   fall prevention program maintained   clutter free environment maintained   assistive device/personal items within reach   activity supervised  Intervention: Prevent Skin Injury  Recent Flowsheet Documentation  Taken 8/5/2021 1620 by Marsha Baldwin RN  Body Position:   lower extremity elevated, left   lower extremity elevated, right  Skin Protection:   adhesive use limited   tubing/devices free from skin contact   transparent dressing maintained   incontinence pads utilized  Intervention: Prevent Infection  Recent Flowsheet Documentation  Taken 8/5/2021 1620 by Marsha Baldwin RN  Infection Prevention:   visitors restricted/screened   single patient room provided   rest/sleep promoted   personal protective equipment utilized   hand hygiene promoted  Goal: Optimal Comfort and Wellbeing  Outcome: Ongoing, Progressing  Intervention: Provide Person-Centered Care  Recent Flowsheet Documentation  Taken 8/5/2021 1620 by Marsha Baldwin RN  Trust Relationship/Rapport: emotional support provided  Goal: Readiness for Transition of Care  Outcome: Ongoing, Progressing   Goal Outcome Evaluation:

## 2021-08-05 NOTE — NURSING NOTE
Notified AMOS Soliz of pt's repeated attempts to remove herself out of bed, and nearly falling.  This RN requested an order for restraints due to inability to monitor patient because of repeated removal of leads.   AMOS Soliz advised to get the risperol into the patient and use restraints if necessary after that.

## 2021-08-05 NOTE — PLAN OF CARE
Goal Outcome Evaluation:         Pt was up all night & was finally medicated & placed in restraints due to agitation & continued attempts to get OOB without assistance. Pt is sleeping now.

## 2021-08-05 NOTE — CASE MANAGEMENT/SOCIAL WORK
Continued Stay Note   [facility]     Patient Name: Doris Brenner  MRN: [id number]  Today's Date: 8/5/2021    Admit Date: 8/4/2021    Discharge Plan     Row Name 08/05/21 1219       Plan    Plan  From [facility]. Confirmed return with [facility] liaison and Legal Guardian (Alvaro). No pre-cert or PASRR required for return.    Plan Comments  Per chart review, ED CM had not been able to obtain POA paperwork. SW sent text to [facility] liaison to request POA paperwork and was informed that pt's son, Alvaro had obtained Legal Guardianship. SW requested this document, which arrived via e-mail from [facility] liaison (Mouna). SW added Legal Guardianship document to the hard chart and faxed to HIM - advised bedside RN, charge RN, and CM verbally. ABHI also changed relationship of pt's son, Alvaro, to Legal Guardian in Epic.        Phone communication or documentation only - no physical contact with patient or family.    Richelle Huynh [facility], LSW    Office: [phone]  Cell: [phone]  Fax: [phone]  E-mail: [email or web address]

## 2021-08-05 NOTE — PLAN OF CARE
Goal Outcome Evaluation:  ST orders received and appreciated. Attempted swallow eval but pt unable to awaken to participate despite max cues from nursing staff. ST will continue to follow and complete eval when pt is able to participate.

## 2021-08-05 NOTE — PROGRESS NOTES
LOS: 0 days   Patient Care Team:  Ahmet Lagos MD as PCP - General (Family Medicine)    Subjective     Interval History:     Patient Complaints: pt is resting now, NAD.  Had a restless night and had to be in 4 point restraints and given meds.      History taken from: patient    Review of Systems   Unable to perform ROS: Dementia           Objective     Vital Signs  Temp:  [97.5 °F (36.4 °C)-98.5 °F (36.9 °C)] 98.5 °F (36.9 °C)  Heart Rate:  [44-94] 76  Resp:  [14-25] 21  BP: (152-210)/() 152/57    Physical Exam:     General Appearance:    Sleeping,  no acute distress   Head:    Normocephalic, without obvious abnormality, atraumatic   Eyes:            Lids and lashes normal, conjunctivae and sclerae normal, no   icterus, no pallor, corneas clear, PERRLA   Ears:    Ears appear intact with no abnormalities noted   Throat:   No oral lesions, no thrush, oral mucosa moist   Neck:   No adenopathy, supple, trachea midline, no thyromegaly, no   carotid bruit, no JVD   Lungs:     Clear to auscultation,respirations regular, even and                  unlabored    Heart:    Regular rhythm and normal rate, normal S1 and S2, no            murmur, no gallop, no rub, no click   Chest Wall:    No abnormalities observed   Abdomen:     Normal bowel sounds, no masses, no organomegaly, soft        non-tender, non-distended, no guarding, no rebound                tenderness   Extremities:   Moves all extremities well, no edema, no cyanosis, no             redness   Pulses:   Pulses palpable and equal bilaterally   Skin:   No bleeding or rash   Lymph nodes:   No palpable adenopathy   Neurologic:   Cranial nerves 2 - 12 grossly intact, sensation intact, DTR       present and equal bilaterally        Results Review:    Lab Results (last 24 hours)     Procedure Component Value Units Date/Time    POC Glucose Once [[id number]]  (Normal) Collected: 08/05/21 1124    Specimen: Blood Updated: 08/05/21 1125     Glucose 98 mg/dL       Comment: Serial Number: [id number]:  [id number]       Vitamin B12 [[id number]]  (Normal) Collected: 08/05/21 0346    Specimen: Blood Updated: 08/05/21 1102     Vitamin B-12 664 pg/mL     Narrative:      Results may be falsely increased if patient taking Biotin.      Hemoglobin A1c [[id number]]  (Normal) Collected: 08/05/21 0346    Specimen: Blood Updated: 08/05/21 1010     Hemoglobin A1C 5.2 %     Narrative:      Hemoglobin A1C Reference Range:    <5.7 %        Normal  5.7-6.4 %     Increased risk for diabetes  > 6.4 %        Diabetes       These guidelines have been recommended by the American Diabetic Association for Hgb A1c.      The following 2010 guidelines have been recommended by the American Diabetes Association for Hemoglobin A1c.    HBA1c 5.7-6.4% Increased risk for future diabetes (pre-diabetes)  HBA1c     >6.4% Diabetes      POC Glucose Once [[id number]]  (Normal) Collected: 08/05/21 0629    Specimen: Blood Updated: 08/05/21 0630     Glucose 97 mg/dL      Comment: Serial Number: [id number]:  [id number]       Basic Metabolic Panel [[id number]]  (Abnormal) Collected: 08/05/21 0346    Specimen: Blood Updated: 08/05/21 0525     Glucose 100 mg/dL      BUN 11 mg/dL      Creatinine 0.60 mg/dL      Sodium 141 mmol/L      Potassium 3.1 mmol/L      Comment: Slight hemolysis detected by analyzer. Results may be affected.        Chloride 102 mmol/L      CO2 27.0 mmol/L      Calcium 9.0 mg/dL      eGFR Non African Amer 95 mL/min/1.73      BUN/Creatinine Ratio 18.3     Anion Gap 12.0 mmol/L     Narrative:      GFR Normal >60  Chronic Kidney Disease <60  Kidney Failure <15      Lipid Panel [[id number]]  (Abnormal) Collected: 08/05/21 0346    Specimen: Blood Updated: 08/05/21 0525     Total Cholesterol 194 mg/dL      Triglycerides 122 mg/dL      HDL Cholesterol 70 mg/dL      LDL Cholesterol  103 mg/dL      VLDL Cholesterol 21 mg/dL      LDL/HDL Ratio 1.42    Narrative:      Cholesterol Reference Ranges  (U.S.  Department of Health and Human Services ATP III Classifications)    Desirable          <200 mg/dL  Borderline High    200-239 mg/dL  High Risk          >240 mg/dL      Triglyceride Reference Ranges  (U.S. Department of Health and Human Services ATP III Classifications)    Normal           <150 mg/dL  Borderline High  150-199 mg/dL  High             200-499 mg/dL  Very High        >500 mg/dL    HDL Reference Ranges  (U.S. Department of Health and Human Services ATP III Classifcations)    Low     <40 mg/dl (major risk factor for CHD)  High    >60 mg/dl ('negative' risk factor for CHD)        LDL Reference Ranges  (U.S. Department of Health and Human Services ATP III Classifcations)    Optimal          <100 mg/dL  Near Optimal     100-129 mg/dL  Borderline High  130-159 mg/dL  High             160-189 mg/dL  Very High        >189 mg/dL    Magnesium [[id number]]  (Normal) Collected: 08/05/21 0346    Specimen: Blood Updated: 08/05/21 0525     Magnesium 1.8 mg/dL     TSH [[id number]]  (Normal) Collected: 08/05/21 0346    Specimen: Blood Updated: 08/05/21 0524     TSH 3.860 uIU/mL     aPTT [[id number]]  (Normal) Collected: 08/05/21 0346    Specimen: Blood Updated: 08/05/21 0515     PTT 26.6 seconds     CBC & Differential [[id number]]  (Abnormal) Collected: 08/05/21 0346    Specimen: Blood Updated: 08/05/21 0458    Narrative:      The following orders were created for panel order CBC & Differential.  Procedure                               Abnormality         Status                     ---------                               -----------         ------                     CBC Auto Differential[[id number]]        Abnormal            Final result                 Please view results for these tests on the individual orders.    CBC Auto Differential [[id number]]  (Abnormal) Collected: 08/05/21 0346    Specimen: Blood Updated: 08/05/21 0458     WBC 7.70 10*3/mm3      RBC 4.54 10*6/mm3      Hemoglobin 15.1 g/dL      Hematocrit 42.8 %       MCV 94.3 fL      MCH 33.4 pg      MCHC 35.4 g/dL      RDW 13.6 %      RDW-SD 45.1 fl      MPV 9.0 fL      Platelets 222 10*3/mm3      Neutrophil % 67.0 %      Lymphocyte % 20.2 %      Monocyte % 10.8 %      Eosinophil % 1.1 %      Basophil % 0.9 %      Neutrophils, Absolute 5.20 10*3/mm3      Lymphocytes, Absolute 1.60 10*3/mm3      Monocytes, Absolute 0.80 10*3/mm3      Eosinophils, Absolute 0.10 10*3/mm3      Basophils, Absolute 0.10 10*3/mm3      nRBC 0.1 /100 WBC     POC Glucose Once [[id number]]  (Abnormal) Collected: 08/05/21 0108    Specimen: Blood Updated: 08/05/21 0110     Glucose 109 mg/dL      Comment: Serial Number: [id number]:  [id number]       Folate [[id number]]  (Normal) Collected: 08/04/21 1532    Specimen: Blood Updated: 08/04/21 2119     Folate >20.00 ng/mL     Narrative:      Results may be falsely increased if patient taking Biotin.      COVID PRE-OP / PRE-PROCEDURE SCREENING ORDER (NO ISOLATION) - Swab, Nasopharynx [[id number]]  (Normal) Collected: 08/04/21 1856    Specimen: Swab from Nasopharynx Updated: 08/04/21 1929    Narrative:      The following orders were created for panel order COVID PRE-OP / PRE-PROCEDURE SCREENING ORDER (NO ISOLATION) - Swab, Nasopharynx.  Procedure                               Abnormality         Status                     ---------                               -----------         ------                     COVID-19,CEPHEID,COR/NARGIS...[[id number]]  Normal              Final result                 Please view results for these tests on the individual orders.    COVID-19,CEPHEID,COR/NARGIS/PAD/RADHA IN-HOUSE(OR EMERGENT/ADD-ON),NP SWAB IN TRANSPORT MEDIA 3-4 HR TAT, RT-PCR - Swab, Nasopharynx [[id number]]  (Normal) Collected: 08/04/21 1856    Specimen: Swab from Nasopharynx Updated: 08/04/21 1929     COVID19 Not Detected    Narrative:      Fact sheet for providers: https://www.fda.gov/media/701146/download     Fact sheet for patients:  https://www.fda.gov/media/652051/download  Fact sheet for providers: https://www.fda.gov/media/558372/download    Fact sheet for patients: https://www.fda.gov/media/546936/download    Test performed by PCR.           Imaging Results (Last 24 Hours)     ** No results found for the last 24 hours. **               I reviewed the patient's new clinical results.    Medication Review:   Scheduled Meds:aspirin, 324 mg, Oral, Daily   Or  aspirin, 300 mg, Rectal, Daily  atorvastatin, 40 mg, Oral, Nightly  carboxymethylcellulose sod, 2 drop, Both Eyes, Daily  levETIRAcetam, 250 mg, Oral, Q12H  LORazepam, 1 mg, Oral, Q12H  risperiDONE, 0.5 mg, Oral, Daily  senna, 1 tablet, Oral, Daily  sodium chloride, 3 mL, Intravenous, Q12H      Continuous Infusions:sodium chloride, 75 mL/hr, Last Rate: 75 mL/hr (08/05/21 0211)      PRN Meds:.hydrALAZINE  •  LORazepam  •  melatonin  •  ondansetron  •  potassium chloride **OR** potassium chloride **OR** potassium chloride  •  sodium chloride  •  [COMPLETED] Insert peripheral IV **AND** sodium chloride  •  sodium chloride     Assessment/Plan       Altered mental status  - MRI of the brain shows small right occiptal infarct (not related to current sx), significant atrophy and evidence of significant chronic strokes, including a large posterior temporal lobe stroke.  Neuro following. Check EEG.  Starting keppra, ASA and lipitor.   Family is not interested in further investigation.      Dementia - continue home meds and ativan for restlessness prn.  Psych consulted     HTN - pt is not on any home meds.  Will add hydralazine prn    Choking - speech therapy referral placed    Plan for disposition:back to [facility] when able    Nguyen Berry MD  08/05/21  15:19 EDT

## 2021-08-05 NOTE — NURSING NOTE
Patients son is at bedside, and is the power of .  No code entered at this time.  Patients son requests pt to be full code.      PT arrived to this unit sedated on ativan due to agitation.  NIH scale was performed to the best of this RNs abilities.  NIH impaired due to sedation, and no previous NIH was documented in the ER.     Pt is responding, and can tell this RN her name, and can answer simple questions at this time.

## 2021-08-05 NOTE — CASE MANAGEMENT/SOCIAL WORK
Continued Stay Note  [facility]     Patient Name: Doris Brenner  MRN: [id number]  Today's Date: 8/5/2021    Admit Date: 8/4/2021    Discharge Plan     Row Name 08/05/21 1220       Plan    Plan  Patient is from [facility]. Per facility and guardian, Reynaldo,  she may return at dc        Expected Discharge Date and Time     Expected Discharge Date Expected Discharge Time    Aug 6, 2021             Arabella Bueno RN   Phone communication or documentation only - no physical contact with patient or family.  Amarilis Bueno RN  Complex Case Manager  [facility] Care Coordination  [phone]  [phone]-office  [phone]  [email or web address]

## 2021-08-05 NOTE — PLAN OF CARE
Problem: Adult Inpatient Plan of Care  Goal: Absence of Hospital-Acquired Illness or Injury  Intervention: Identify and Manage Fall Risk  Recent Flowsheet Documentation  Taken 8/5/2021 0400 by Damián Lemus RN  Safety Promotion/Fall Prevention:   assistive device/personal items within reach   clutter free environment maintained   fall prevention program maintained   nonskid shoes/slippers when out of bed   room organization consistent   safety round/check completed  Taken 8/5/2021 0300 by Damián Lemus RN  Safety Promotion/Fall Prevention: safety round/check completed  Taken 8/5/2021 0200 by Damián Lemus RN  Safety Promotion/Fall Prevention: safety round/check completed  Taken 8/5/2021 0100 by Damián Lemus RN  Safety Promotion/Fall Prevention: safety round/check completed  Taken 8/5/2021 0000 by Damián Lemus RN  Safety Promotion/Fall Prevention:   assistive device/personal items within reach   clutter free environment maintained   fall prevention program maintained   nonskid shoes/slippers when out of bed   room organization consistent   safety round/check completed  Intervention: Prevent Skin Injury  Recent Flowsheet Documentation  Taken 8/5/2021 0400 by Damián Lemus RN  Body Position:   turned   side-lying, right  Taken 8/5/2021 0200 by Damián Lemus RN  Body Position:   turned   side-lying, left  Taken 8/5/2021 0000 by Damián Lemus RN  Body Position: position changed independently  Intervention: Prevent and Manage VTE (venous thromboembolism) Risk  Recent Flowsheet Documentation  Taken 8/5/2021 0400 by Damián Lemus RN  VTE Prevention/Management:   bilateral   sequential compression devices on  Taken 8/5/2021 0000 by Damián Lemus RN  VTE Prevention/Management:   bilateral   sequential compression devices off   patient refused intervention  Intervention: Prevent Infection  Recent Flowsheet Documentation  Taken 8/5/2021 0400 by Damián Lemus  RN  Infection Prevention:   environmental surveillance performed   hand hygiene promoted   rest/sleep promoted   single patient room provided  Taken 8/5/2021 0000 by Damián Lemus RN  Infection Prevention:   environmental surveillance performed   hand hygiene promoted   rest/sleep promoted   single patient room provided  Goal: Optimal Comfort and Wellbeing  Intervention: Provide Person-Centered Care  Recent Flowsheet Documentation  Taken 8/5/2021 0400 by Damián Lemus RN  Trust Relationship/Rapport:   care explained   choices provided   questions answered   questions encouraged  Taken 8/5/2021 0000 by Damián Lemus RN  Trust Relationship/Rapport:   care explained   choices provided   questions answered   questions encouraged  Goal: Readiness for Transition of Care  Intervention: Mutually Develop Transition Plan  Recent Flowsheet Documentation  Taken 8/4/2021 2317 by Damián Lemus RN  Equipment Currently Used at Home: none     Problem: Fall Injury Risk  Goal: Absence of Fall and Fall-Related Injury  Intervention: Identify and Manage Contributors to Fall Injury Risk  Recent Flowsheet Documentation  Taken 8/5/2021 0400 by Damián Lemus RN  Medication Review/Management:   medications reviewed   high-risk medications identified  Taken 8/5/2021 0000 by Damián Lemus RN  Medication Review/Management:   medications reviewed   high-risk medications identified  Intervention: Promote Injury-Free Environment  Recent Flowsheet Documentation  Taken 8/5/2021 0400 by Damián Lemus RN  Safety Promotion/Fall Prevention:   assistive device/personal items within reach   clutter free environment maintained   fall prevention program maintained   nonskid shoes/slippers when out of bed   room organization consistent   safety round/check completed  Taken 8/5/2021 0300 by Damián Lemus RN  Safety Promotion/Fall Prevention: safety round/check completed  Taken 8/5/2021 0200 by Damián Lemus  RN  Safety Promotion/Fall Prevention: safety round/check completed  Taken 8/5/2021 0100 by Damián Lemus RN  Safety Promotion/Fall Prevention: safety round/check completed  Taken 8/5/2021 0000 by Damián Lemus RN  Safety Promotion/Fall Prevention:   assistive device/personal items within reach   clutter free environment maintained   fall prevention program maintained   nonskid shoes/slippers when out of bed   room organization consistent   safety round/check completed     Problem: Restraint, Nonbehavioral (Nonviolent)  Goal: Personal Dignity and Safety Maintained  Intervention: Protect Dignity, Rights, and Personal Wellbeing  Recent Flowsheet Documentation  Taken 8/5/2021 0400 by Damián Lemus RN  Trust Relationship/Rapport:   care explained   choices provided   questions answered   questions encouraged  Taken 8/5/2021 0000 by Damián Lemus RN  Trust Relationship/Rapport:   care explained   choices provided   questions answered   questions encouraged  Intervention: Protect Skin and Joint Integrity  Recent Flowsheet Documentation  Taken 8/5/2021 0400 by Damián Lemus RN  Body Position:   turned   side-lying, right  Taken 8/5/2021 0200 by Damián Lemus RN  Body Position:   turned   side-lying, left  Taken 8/5/2021 0000 by Damián Lemus RN  Body Position: position changed independently     Problem: Skin Injury Risk Increased  Goal: Skin Health and Integrity  Intervention: Optimize Skin Protection  Recent Flowsheet Documentation  Taken 8/5/2021 0400 by Damián Lemus RN  Head of Bed (HOB): HOB elevated  Taken 8/5/2021 0200 by Damián Lemus RN  Head of Bed (HOB): HOB elevated  Taken 8/5/2021 0000 by Damián Lemus RN  Head of Bed (HOB): HOB elevated   Goal Outcome Evaluation:

## 2021-08-05 NOTE — NURSING NOTE
Attempted to give patient dissolvable table, and pt began coughing.  Patient made NPO by this RN.  Vital signs remain stable. SLP consult made.

## 2021-08-05 NOTE — NURSING NOTE
Spoke with NP Martínez about pt's elevated blood pressures, although they seem to be returning to a more acceptable level.  In addition, this RN requested NP to start fluids due to making the pt NPO, and make the pt a full code per son.

## 2021-08-05 NOTE — NURSING NOTE
Notified NP Marlenynan of waking, and repeated attempts to get out of bed, which would result in a fall.  Bed alarm is set at this time, and redirection is not working at this time.  NP ordered a one time ativan.  In addition, this RN requested a dose of heparin or lovenox due to the inability to keep SCD's on this patient, with it being a safety issue.  NP stated to see if the ativan worked, and then attempt to get SCD's on the patient.    PT is ripping off all telemetry, o2 sensors, BP cuffs at this time

## 2021-08-05 NOTE — CONSULTS
Referring Provider: Nguyen Berry MD  Reason for Consultation: Requesting advice/opinion on condition      Chief complaint Delirium and Agitation    Subjective .     History of present illness:  The patient is a 86 y.o. female who was admitted secondary to AMS likely due to an acute/subacute 10mm ischemic infarct seen on MRI. The patient is not responsive to verbal and physical stimulation upon evaluation. Nursing reports she has been asleep since Ativan and Risperdal was administered. Risperdal M-tabs were requested by nursing due to difficulty administering the regular tabs.      Review of Systems   Review of systems could not be obtained due to   patient unresponsive.    History    Past psychiatric history Unable to assess.  -Psychiatric Hospitalizations: Unable to assess.  -Suicide Attempts: Unable to assess.    Past Medical History:   Diagnosis Date   • Hypertension    • Stroke (CMS/HCC)         No family history on file.     Social History     Tobacco Use   • Smoking status: Never Smoker   • Smokeless tobacco: Never Used   Substance Use Topics   • Alcohol use: Yes     Alcohol/week: 14.0 standard drinks     Types: 14 Glasses of wine per week   • Drug use: No          Medications Prior to Admission   Medication Sig Dispense Refill Last Dose   • carboxymethylcellulose sod (Refresh Liquigel) 1 % gel eye gel Administer 2 drops to both eyes Daily.   8/3/2021 at 0932   • LORazepam (ATIVAN) 1 MG tablet Take 1 mg by mouth 2 (two) times a day.   8/3/2021 at 2017   • risperiDONE (risperDAL) 0.5 MG tablet Take 0.5 mg by mouth Daily.   8/3/2021 at 0932   • senna (senna) 8.6 MG tablet Take 1 tablet by mouth Daily.   8/3/2021 at 0932        Scheduled Meds:  aspirin, 324 mg, Oral, Daily   Or  aspirin, 300 mg, Rectal, Daily  atorvastatin, 40 mg, Oral, Nightly  carboxymethylcellulose sod, 2 drop, Both Eyes, Daily  levETIRAcetam, 250 mg, Oral, Q12H  LORazepam, 1 mg, Oral, Q12H  risperiDONE, 0.5 mg, Oral, Daily  senna, 1  "tablet, Oral, Daily  sodium chloride, 3 mL, Intravenous, Q12H         Continuous Infusions:  sodium chloride, 75 mL/hr, Last Rate: 75 mL/hr (08/05/21 1528)        PRN Meds:  hydrALAZINE  •  LORazepam  •  melatonin  •  ondansetron  •  potassium chloride **OR** potassium chloride **OR** potassium chloride  •  sodium chloride  •  [COMPLETED] Insert peripheral IV **AND** sodium chloride  •  sodium chloride      Allergies:  Patient has no known allergies.    Objective       Physical Exam:    Vital Signs:   /66 (BP Location: Right arm, Patient Position: Lying)   Pulse 71   Temp 98.7 °F (37.1 °C) (Axillary)   Resp 18   Ht 165.1 cm (65\")   Wt 43.1 kg (95 lb)   SpO2 97%   BMI 15.81 kg/m²     General Appearance:    Asleep with NAD.   Head:    Normocephalic, without obvious deformity, atraumatic.   Eyes:            Lids and lashes normal, conjunctivae and sclerae normal, no   icterus, no pallor, corneas clear.   Skin:  Neurologic:  Musculoskeletal:   No bleeding, bruising or rash.  Cranial nerves 2 - 12 grossly intact, sensation intact.  Muscle tone: Hypotonic and atrophied. Strength unable to assess.  Abnormal Movements: No tremors or abnormal involuntary movements  Gait: Deferred, in bed     Mental Status Exam:   Orientation:  Unable to evaluate  Memory: Recent and remote memory Deficits  Mood/Affect: Unable to assess.  Suicidal Ideations: Unable to assess.  Homicidal Ideations:  Unable to assess.  Hallucinations: Unable to assess.  Delusions:  Unable to assess.  Obsessions: Unable to assess.  Behavior and Psychomotor Activity: Unable to assess.  Speech:  Unable to assess.  Thought Process: Unable to assess.  Associations: Unable to assess.  Thought Content: Unable to assess.  Language: Unable to assess.  Concentration and computation: Unable to assess.  Attention Span: Unable to assess.  Fund of Knowledge: Unable to assess.  Reliability: Poor  Insight into mental health: Poor  Judgement: Poor  Impulse Control:  " Unable to assess.  Hygiene: fair  Cooperation:  Unable to assess.  Eye Contact:  Closed    Medications and allergies reviewed.    Lab Results   Component Value Date    GLUCOSE 100 (H) 08/05/2021    CALCIUM 9.0 08/05/2021     08/05/2021    K 3.1 (L) 08/05/2021    CO2 27.0 08/05/2021     08/05/2021    BUN 11 08/05/2021    CREATININE 0.60 08/05/2021    EGFRIFNONA 95 08/05/2021    BCR 18.3 08/05/2021    ANIONGAP 12.0 08/05/2021       Last Urine Toxicity    There is no flowsheet data to display.         No results found for: PHENYTOIN, PHENOBARB, VALPROATE, CBMZ    Lab Results   Component Value Date     08/05/2021    BUN 11 08/05/2021    CREATININE 0.60 08/05/2021    TSH 3.860 08/05/2021    WBC 7.70 08/05/2021       Brief Urine Lab Results  (Last result in the past 365 days)      Color   Clarity   Blood   Leuk Est   Nitrite   Protein   CREAT   Urine HCG        08/04/21 0211 Yellow Clear Negative Negative Negative Negative               Assessment/Plan       Altered mental status       LABS: Reviewed.    Assessment: Dementia with delirium from cerebral vascular disease.    Treatment Plan:  -Initiate Risperdal M-tabs .5mg bid for restlessness and agitation. Consider changing to injectable if needed.  -Continue to follow.    Treatment Plan discussed with: Nursing      I discussed the patients findings and my recommendations with nursing staff    I have reviewed and approved the behavioral health treatment plans and problem list. Yes  Thank you for the consult  Referring MD has access to consult report and progress notes in EMR  Patient was examined wearing appropriate PPE.    Adal Gonzalez PA-C  08/05/21  19:56 EDT    EMR Dragon transcription disclaimer:  Some of this encounter note is an electronic transcription translation of spoken language to printed text. The electronic translation of spoken language may permit erroneous, or at times, nonsensical words or phrases to be inadvertently  transcribed; Although I have reviewed the note for such errors some may still exist.

## 2021-08-05 NOTE — PROGRESS NOTES
LOS: 0 days     Chief Complaint: Mental status changes     SUBJECTIVE:  History taken from: chart family RN    Interval History: Events noted from last night  The patient had a bad night yesterday  Very agitated  Had to be physically restrained, four-point restraints and also given several medications    She heart MRI of the brain which shows a small right occipital infarct which has nothing to do with her symptoms  But she has significant atrophy and significant chronic strokes including a rather large, the posterior temporal lobe is almost gone on the right side and these are all old strokes    I talked to her son in very detail, he is the POA  The decision from him was that we are not going to do anything with the strokes with the patient was able to walk about 2 weeks back and now progressively has gotten worse and now all this mental status changes    As I mentioned in my consult yesterday, its not unheard of that the patient's would decompensate leg especially when they moved    I would check her EEG and put her on low-dose of antiseizure medication because with a large temporal stroke that is a possibility but otherwise stroke work-up would be deferred at this moment        Patient Complaints: Not possible      Review of Systems   Not possible    Pertinent PMH:  has a past medical history of Hypertension and Stroke (CMS/[facility]).   ________________________________________________     OBJECTIVE:  Patient is asleep      Neurologic Exam    Patient was heavily sedated and now asleep and I would reevaluate her when she is up    ________________________________________________   RESULTS REVIEW    VITAL SIGNS:  Temp:  [97.5 °F (36.4 °C)-98.5 °F (36.9 °C)] 98.5 °F (36.9 °C)  Heart Rate:  [44-94] 76  Resp:  [14-25] 21  BP: (152-210)/() 152/57    LABS:   Lab Results   Component Value Date    WBC 7.70 08/05/2021    HGB 15.1 08/05/2021    HCT 42.8 08/05/2021    MCV 94.3 08/05/2021     08/05/2021     Lab  Results   Component Value Date    GLUCOSE 100 (H) 08/05/2021    BUN 11 08/05/2021    CREATININE 0.60 08/05/2021    EGFRIFNONA 95 08/05/2021    BCR 18.3 08/05/2021    K 3.1 (L) 08/05/2021    CO2 27.0 08/05/2021    CALCIUM 9.0 08/05/2021    ALBUMIN 3.60 08/04/2021    AST 49 (H) 08/04/2021    ALT 17 08/04/2021       Lab Results   Component Value Date    TSH 3.860 08/05/2021     (H) 08/05/2021    HGBA1C 5.2 08/05/2021    [id number] 664 08/05/2021         IMAGING STUDIES:  CT Head Without Contrast    Result Date: 8/4/2021  1. No acute intracranial process. MRI is more sensitive for the detection of acute nonhemorrhagic infarct. 2. Severe changes small vessel ischemic disease of indeterminate age, presumably mostly chronic. Volume loss. Atherosclerosis. Old infarct right posterior cerebral artery territory. Electronically signed by:  Javier Black M.D.  8/4/2021 1:28 AM    MRI Brain Without Contrast    Result Date: 8/4/2021  1. 10 mm focal acute or subacute ischemic insult within the medial right occipital lobe cortex without hemorrhagic transformation. 2. Old infarcts with large territory encephalomalacia within the right central vascular flow. 3. Chronic lacunar infarcts in the central jonathan and right thalamus. FINDINGS compatible with extensive chronic microvascular disease. 4. Moderate atrophy. 5. Scattered tiny foci of susceptibility signal change bilaterally may reflect changes of a prevoid angiopathy or sequelae of old bleed.  Electronically Signed By-Emi Amaya MD On:8/4/2021 3:46 PM This report was finalized on [id number] by  Emi Amaya MD.    XR Chest 1 View    Result Date: 8/4/2021   1. Cardiomegaly. 2. No focal consolidation.  Electronically Signed By-Jaycob Villarreal MD On:8/4/2021 7:24 AM This report was finalized on [id number] by  Jaycob Villarreal MD.      I reviewed the patient's new clinical results.    ________________________________________________      PROBLEM LIST:    Altered  mental status        Assessment/Plan   ASSESSMENT/PLAN:  Very encephalopathy, delirium on top of dementia  Incidental new right occipital stroke, please refer to my discussion in HPI  We will check her EEG and start low-dose Keppra    Family want conservative management and no further testing or procedures as they are not interested in surgery etc.    We will start aspirin and Lipitor    **Please refer to previous notes for further details and recommendations.     I discussed the patient's findings and my recommendations with family and nursing staff    Nathalia Razo MD  08/05/21  14:02 EDT

## 2021-08-05 NOTE — NURSING NOTE
Patient is sleeping restraints have been removed.Patient not attempting to pull out IV or get out of bed at this time. Will continue to monitor patient

## 2021-08-05 NOTE — PLAN OF CARE
Goal Outcome Evaluation:    RN request to hold off eval to allow patient to sleep due to lack of sleep last night with pt climbing out of bed and now restrained.

## 2021-08-06 NOTE — PLAN OF CARE
Goal Outcome Evaluation:      Pt continues to be non-responsive other than minimal grimacing this date. No soft restraints present but all 4 bed rails are up for safety. Pt did not arouse to strong sternal rub & calling her name. Will FU for OT evaluation when more appropriate.

## 2021-08-06 NOTE — PROGRESS NOTES
Patient still very lethargic, minimal responsiveness but he did respond to me and said good morning to me.  EEG was abnormal and have already started her on low-dose Keppra  No seizures observed  Continue present supportive care

## 2021-08-06 NOTE — PROGRESS NOTES
Nutrition Services    Patient Name: Doris Brenner  YOB: 1935  MRN: [id number]  Admission date: 8/4/2021      Comments:  Patient currently NPO.  Recommend to initiate enteral nutrition if within goals of care due to severe malnutrition and inability to take po nutrition at this time.    *Please consult dietitian if enteral nutrition desired.    Severe chronic disease related malnutrition related to inadequate oral intake in the context of dementia as evidenced by po intake meeting less than 75% of needs for greater than 1 month with physical exam revealing overall severe muscle/fat wasting.    See MSA at bottom of note.      PPE Documentation        PPE Worn By Provider mask, gloves and eye protection   PPE Worn By Patient  none     CLINICAL NUTRITION ASSESSMENT       Reason for Assessment 8/6/21 Low BMI     H&P:  86-year-old WF with dementia was brought in by EMS for altered mental status from [facility].     Past Medical History:   Diagnosis Date   • Hypertension    • Stroke (CMS/HCC)        Past Surgical History:   Procedure Laterality Date   • HYSTERECTOMY          Current Problems:   AMS  -chronic strokes, current R occipital infarct    Dementia    HTN    Choking  -ST made NPO       Nutrition/Diet History         Narrative     8/6: Met with patient and granddaughter in room. Pt is sleeping and does not wake during visit. Nursing reports patient had previously been combative, but received Ativan and has been sleeping. Pt was assessed by ST, to lethargic for po intake. Granddaughter states patient was choking on liquids right before admission. Has only been at LTC facility for 2 weeks, prior to this pt was living with her  who was unable to care for her and family states pt would only eat one small meal per day (splitting a hamburger with ) due to not being capable of fixing food with . Family confirms weight loss. NFPE completed.    S/w patient's RN regarding  "recommendations for EN if within goals of care, RN to call MD.     Functional Status LTC resident     Food Allergies NKFA   Factors Affecting   Nutritional Intake NPO status, AMS with hx dementia     Anthropometrics        Current Height, Weight Height: 165.1 cm (65\")  Weight: 46 kg (101 lb 6.6 oz) (08/06/21 0500)        Admit Height, Weight -    Flowsheet Rows      First Filed Value   Admission Height  165.1 cm (65\") Documented at 08/04/2021 0141   Admission Weight  43.1 kg (95 lb) Documented at 08/04/2021 0141             Ideal Body Weight (IBW) 125lb   % Ideal Body Weight 81%       Usual Body Weight UTD   % Usual Body Weight UTD   Wt Change Observation -weight down 22% since 11/2019   Weight Hx    Wt Readings from Last 30 Encounters:   08/06/21 0500 46 kg (101 lb 6.6 oz)   08/04/21 1229 43.1 kg (95 lb)   08/04/21 0141 43.1 kg (95 lb)   11/18/19 0615 59.4 kg (130 lb 15.3 oz)   11/17/19 1017 58.1 kg (128 lb 1.4 oz)        BMI kg/m2 Body mass index is 16.88 kg/m².       Labs/Medications         Pertinent Labs -   Results from last 7 days   Lab Units 08/06/21 0321 08/05/21  0346 08/04/21  0154   SODIUM mmol/L 137 141 137   POTASSIUM mmol/L 3.9 3.1* 3.6   CHLORIDE mmol/L 104 102 101   CO2 mmol/L 22.0 27.0 26.0   BUN mg/dL 12 11 15   CREATININE mg/dL 0.58 0.60 0.73   CALCIUM mg/dL 8.6 9.0 8.8   BILIRUBIN mg/dL  --   --  0.8   ALK PHOS U/L  --   --  51   ALT (SGPT) U/L  --   --  17   AST (SGOT) U/L  --   --  49*   GLUCOSE mg/dL 90 100* 109*     Results from last 7 days   Lab Units 08/06/21 0321 08/05/21 0346 08/05/21  0346   MAGNESIUM mg/dL  --   --  1.8   HEMOGLOBIN g/dL 14.4   < > 15.1   HEMATOCRIT % 40.8   < > 42.8   TRIGLYCERIDES mg/dL  --   --  122    < > = values in this interval not displayed.     COVID19   Date Value Ref Range Status   08/04/2021 Not Detected Not Detected - Ref. Range Final     Lab Results   Component Value Date    HGBA1C 5.2 08/05/2021         Pertinent Medications -IVF     Physical Findings " "       Overall Physical   Appearance, MSA 8/6: NFPE completed, see MSA. Pt meets criteria for severe malnutrition   -  Edema  none     Gastrointestinal No BM since admit, 2 days   Tubes none   Oral/Mouth Cavity NPO by ST     Skin  no breakdown        Estimated/Assessed Needs       Energy Requirements    Height for Calculation  Height: 165.1 cm (65\")   Weight for Calculation -57kg, IBW   Method for Estimation  -30kcal/kg   EST Needs (kcal/day) -1710kcal/day       Protein Requirements    Weight for Calculation -57kg, IBW   EST Protein Needs (g/kg) -1.2-1.5g/kg   EST Daily Needs (g/day) -68-86g/day       Fluid Requirements     Estimated Needs (mL/day) -1mL/kcal       Fluid Deficit    Current Na Level (mEq/L) -   Desired Na Level (mEq/L) -   Estimated Fluid Deficit (L)  -     Current Nutrition Orders & Evaluation of Received Nutrient/Fluid Intake       Oral Nutrition     Current PO Diet NPO Diet   Supplement -   PO Evaluation     % PO Intake -no po intake since admit   -  Enteral Nutrition    Enteral Route -   TF Modular -   TF Delivery Method -   Current Ordered TF  -   Current Ordered H2O flush  -    TF Observation  -   EN Evaluation     TF Changes -    TF Residual -    TF Tolerance -    Average EN Delivered -       Parenteral Nutrition     TPN Route -   Current Ordered TPN VOL  -   Dextrose (g/kcals)  -   Amino Acid (g/kcals) -   Lipids (mL/Concentration/FREQ)  -   MVI Frequency  -   Trace Element Frequency  -   TPN Observation  -    TPN Evaluation    Total # Days on TPN -   -  Clinical Course    Nutrition Course Details 8/4 Regular  8/5 NPO     Nutritional Risk Screening        NRS-2002 Score   -       Nutrition Diagnosis         Nutrition Dx Problem 1 -Severe chronic disease related malnutrition related to inadequate oral intake in the context of dementia as evidenced by po intake meeting less than 75% of needs for greater than 1 month with physical exam revealing overall severe muscle/fat wasting.      Nutrition " Dx Problem 2 -       Intervention Goal         Intervention Goal(s) -Nutrition started (po vs EN)  -goals of care addressed     Nutrition Intervention        RD/Tech Action -Workup TF       Nutrition Prescription          Diet Prescription -NPO   Supplement Prescription -   -  Enteral Prescription -If enteral nutrition started:  Carlos HN @65mL/hour goal to provide 1716kcal (100%), 77g protein (100%), 1172mL free water    Free water flush pending clinical picture and use of IV fluids.    Patient at high risk for refeeding syndrome, initiate at 25mL/hour       TPN Prescription -     Monitor/Evaluation        Monitor I&O, Pertinent labs, EN delivery/tolerance, Weight, Skin status, GI status, POC/GOC, Swallow function       Malnutrition Severity Assessment      Patient meets criteria for : Severe Malnutrition     Malnutrition Type (last 8 hours)      Malnutrition Severity Assessment     Row Name 08/06/21 1510       Malnutrition Severity Assessment    Malnutrition Type  Chronic Disease - Related Malnutrition    Row Name 08/06/21 1510       Insufficient Energy Intake     Insufficient Energy Intake Findings  Severe    Insufficient Energy Intake   <75% of est. energy requirement for > or equal to 1 month    Row Name 08/06/21 1510       Muscle Loss    Loss of Muscle Mass Findings  Severe    Judaism Region  Severe - deep hollowing/scooping, lack of muscle to touch, facial bones well defined    Clavicle Bone Region  Moderate - some protrusion in females, visible in males    Acromion Bone Region  Moderate - acromion may slightly protrude    Scapular Bone Region  Moderate - mild depression, bones may show slightly    Dorsal Hand Region  Severe - prominent depression    Patellar Region  Severe - prominent bone, square looking, very little muscle definition    Anterior Thigh Region  Severe - line/depression along thigh, obviously thin    Posterior Calf Region  Severe - thin with very little definition/firmness    Row Name  08/06/21 1510       Fat Loss    Subcutaneous Fat Loss Findings  Severe    Orbital Region   Severe - pronounced hollowness/depression, dark circles, loose saggy skin    Upper Arm Region  Severe - mostly skin, very little space between folds, fingers touch    Thoracic & Lumbar Region  Moderate - ribs visible with mild depressions, iliac crest somewhat prominent    Row Name 08/06/21 1510       Criteria Met (Must meet criteria for severity in at least 2 of these categories: M Wasting, Fat Loss, Fluid, Secondary Signs, Wt. Status, Intake)    Patient meets criteria for   Severe Malnutrition               Electronically signed by:  Rema Araiza RD  08/06/21 14:54 EDT

## 2021-08-06 NOTE — THERAPY EVALUATION
Patient Name: Doris Brenner  : 1935    MRN: [id number]                              Today's Date: 2021       Admit Date: 2021    Visit Dx:     ICD-10-CM ICD-9-CM   1. Altered mental status, unspecified altered mental status type  R41.82 780.97     Patient Active Problem List   Diagnosis   • Confusion   • Altered mental status     Past Medical History:   Diagnosis Date   • Hypertension    • Stroke (CMS/HCC)      Past Surgical History:   Procedure Laterality Date   • HYSTERECTOMY       General Information     Row Name 21          Physical Therapy Time and Intention    Document Type  evaluation  -CR     Mode of Treatment  physical therapy  -CR     Row Name 21          General Information    Patient Profile Reviewed  yes  -CR     Prior Level of Function  -- unknown  -CR     Existing Precautions/Restrictions  fall  -CR     Barriers to Rehab  cognitive status;medically complex  -CR     Row Name 21          Living Environment    Lives With  facility resident  -CR     Row Name 21          Home Main Entrance    Number of Stairs, Main Entrance  none  -CR     Row Name 21          Stairs Within Home, Primary    Number of Stairs, Within Home, Primary  none  -CR     Row Name 21          Cognition    Orientation Status (Cognition)  unable/difficult to assess not waking up to participate  -CR     Row Name 21          Safety Issues, Functional Mobility    Safety Issues Affecting Function (Mobility)  ability to follow commands  -CR     Impairments Affecting Function (Mobility)  cognition  -CR       User Key  (r) = Recorded By, (t) = Taken By, (c) = Cosigned By    Initials Name Provider Type    CR Reyes, Carmela, PT Physical Therapist        Mobility     Row Name 21 1024          Bed Mobility    Bed Mobility  rolling left;rolling right  -CR     Rolling Left [place] (Bed Mobility)  dependent (less than 25% patient effort)  -CR      Rolling Right [place] (Bed Mobility)  dependent (less than 25% patient effort)  -CR     Assistive Device (Bed Mobility)  draw sheet  -CR       User Key  (r) = Recorded By, (t) = Taken By, (c) = Cosigned By    Initials Name Provider Type    CR Reyes, Carmela, PT Physical Therapist        Obj/Interventions     Row Name 08/06/21 1024          Range of Motion Comprehensive    Comment, General Range of Motion  PROM BLE wfl  -CR     Row Name 08/06/21 1024          Strength Comprehensive (MMT)    Comment, General Manual Muscle Testing (MMT) Assessment  unable to assess as pt did not awaken  -CR     Row Name 08/06/21 1024          Balance    Comment, Balance  dependent for rolling, sitting EOB not attempted  -CR       User Key  (r) = Recorded By, (t) = Taken By, (c) = Cosigned By    Initials Name Provider Type    CR Reyes, Carmela, PT Physical Therapist        Goals/Plan    No documentation.       Clinical Impression     Row Name 08/06/21 1025          Pain    Additional Documentation  Pain Scale: FACES Pre/Post-Treatment (Group)  -CR     Row Name 08/06/21 1025          Pain Scale: FACES Pre/Post-Treatment    Pain: FACES Scale, Pretreatment  0-->no hurt  -CR     Posttreatment Pain Rating  2-->hurts little bit  -CR     Pain Location  back  -CR     Pre/Posttreatment Pain Comment  grimacing noted when pt turned  -CR     Row Name 08/06/21 1025          Plan of Care Review    Plan of Care Reviewed With  patient  -CR     Outcome Summary  85 y/o female admitted on 8/4 due to report of being poorly responsive at nh. PMH includes cva, HTN. Unknown PLOF. At time of this assessment, pt remain poorly responsive despite PROM to BLE and rolling side to side. Pt dependent for all mobility and care. Not able to participate in physical therapy services at this time, will sign off.  -CR       User Key  (r) = Recorded By, (t) = Taken By, (c) = Cosigned By    Initials Name Provider Type    CR Reyes, Carmela, PT Physical Therapist         Outcome Measures     Row Name 08/06/21 1028          How much help from another person do you currently need...    Turning from your back to your side while in flat bed without using bedrails?  1  -CR     Moving from lying on back to sitting on the side of a flat bed without bedrails?  1  -CR     Moving to and from a bed to a chair (including a wheelchair)?  1  -CR     Standing up from a chair using your arms (e.g., wheelchair, bedside chair)?  1  -CR     Climbing 3-5 steps with a railing?  1  -CR     To walk in hospital room?  1  -CR     AM-PAC 6 Clicks Score (PT)  6  -CR     Row Name 08/06/21 1028          Modified [place] Scale    Modified [place] Scale  5 - Severe disability.  Bedridden, incontinent, and requiring constant nursing care and attention.  -CR     Row Name 08/06/21 1028          Functional Assessment    Outcome Measure Options  Modified [place];AM-PAC 6 Clicks Basic Mobility (PT)  -CR       User Key  (r) = Recorded By, (t) = Taken By, (c) = Cosigned By    Initials Name Provider Type    CR Reyes, Carmela, PT Physical Therapist                       Physical Therapy Education                 Title: PT OT SLP Therapies (In Progress)     Topic: Physical Therapy (Resolved)     Point: Mobility training (Resolved)     Learning Progress Summary           Patient Nonacceptance, D, NL by CR at 8/6/2021 1028                               User Key     Initials Effective Dates Name Provider Type Discipline    CR 06/16/21 -  Reyes, Carmela, PT Physical Therapist PT              PT Recommendation and Plan     Plan of Care Reviewed With: patient  Outcome Summary: 85 y/o female admitted on 8/4 due to report of being poorly responsive at nh. PMH includes cva, HTN. Unknown PLOF. At time of this assessment, pt remain poorly responsive despite PROM to BLE and rolling side to side. Pt dependent for all mobility and care. Not able to participate in physical therapy services at this time, will sign off.     Time  Calculation:   PT Charges     Row Name 08/06/21 1029             Time Calculation    Start Time  0900  -CR      Stop Time  0918  -CR      Time Calculation (min)  18 min  -CR      PT Received On  08/06/21  -CR         Time Calculation- PT    Total Timed Code Minutes- PT  0 minute(s)  -CR        User Key  (r) = Recorded By, (t) = Taken By, (c) = Cosigned By    Initials Name Provider Type    CR Reyes, Carmela, CHAD Physical Therapist        Therapy Charges for Today     Code Description Service Date Service Provider Modifiers Qty    29343850740 HC PT EVAL LOW COMPLEXITY 3 8/6/2021 Reyes, Carmela, PT GP 1          PT G-Codes  Outcome Measure Options: Modified [place], AM-PAC 6 Clicks Basic Mobility (PT)  AM-PAC 6 Clicks Score (PT): 6  Modified [place] Scale: 5 - Severe disability.  Bedridden, incontinent, and requiring constant nursing care and attention.    Carmela Reyes, PT  8/6/2021

## 2021-08-06 NOTE — PROCEDURES
This is an inpatient,   Digitally recorded multi-montage EEG with leads placed according to the international 10/20 system  Photic stimulation was not done  Hyperventilation was not done    With the patient aroused, though I think she was drowsy anyway, left side back out it is 5 to 6 Hz and right-sided 5 to 8 Hz.  This especially with significant slowing between F7 and T3 and to some degree between F3 and C3    She did fall asleep in stage II sleep was seen    There was clear-cut phase reversing between F7 and T3 and he rarely elsewhere but later on they were very sharp phase reversing activity  No clinical events      Impression:  This is an abnormal adult awake/drowsy and asleep EEG  There were focal features and irritable foci on the left side  There was slowing more on the left as compared to the right but right was also slow  These are signs of possible irritable foci and epilepsy and also encephalopathy

## 2021-08-06 NOTE — PLAN OF CARE
"Goal Outcome Evaluation:   The patient was seated up at approximately a a 90 degree angle in her bed for a clinical swallow assessment. Oral care completed with patient initially making no response, however as oral care continued she was noted to close lips on toothette and did attempt to orally manipulate. No observable swallow noted. Pt did respond to her to request for her name stating \"Michelle\". She remained minimally verbally responsive, however, throughout the remainder of the session and did not follow any other auditory commands. She was accepting of 2 trials of ice chips. Increased oral transit/manipulation noted. Excessive \"chewing\" evident. Mild anterior labial spillage noted. No observable swallow noted. Pt presented with increasing fatigue/lethargy and was not appropriate for additional trials or consistencies at this time.    RECOMMEND: ST unable to make appropriate or safe diet recommendations at this time due to decreased alertness/responses. Recommend that the patient remain NPO at this time, with ST to re-evaluate as appropriate. Discussed with nursing staff and they are in agreement with plan. Will continue to follow during her hospitalization               "

## 2021-08-06 NOTE — THERAPY EVALUATION
Acute Care - Speech Language Pathology   Swallow Initial Evaluation  [place]     Patient Name: Doris Brenner  : 1935  MRN: [id number]  Today's Date: 2021               Admit Date: 2021    Visit Dx:     ICD-10-CM ICD-9-CM   1. Altered mental status, unspecified altered mental status type  R41.82 780.97     Patient Active Problem List   Diagnosis   • Confusion   • Altered mental status     Past Medical History:   Diagnosis Date   • Hypertension    • Stroke (CMS/HCC)      Past Surgical History:   Procedure Laterality Date   • HYSTERECTOMY         SLP Recommendation and Plan  SLP Swallowing Diagnosis: mod-severe, oral dysphagia, R/O pharyngeal dysphagia  SLP Diet Recommendation: NPO, temporary alternate methods of nutrition/hydration     SLP Rec. for Method of Medication Administration: meds via alternate route        Recommended Diagnostics: reassess via clinical swallow evaluation  Swallow Criteria for Skilled Therapeutic Interventions Met: demonstrates skilled criteria  Anticipated Discharge Disposition (SLP): extended care facility  Rehab Potential/Prognosis, Swallowing: adequate, monitor progress closely  Therapy Frequency (Swallow): 4 days per week, 5 days per week  Predicted Duration Therapy Intervention (Days): until discharge           SWALLOW EVALUATION (last 72 hours)      SLP Adult Swallow Evaluation     Row Name 21 1100          Document Type  evaluation  -SM    Subjective Information  no complaints;fatigue Decreased alertness  -    Patient/Family/Caregiver Comments/Observations  No caregivers present  -    Care Plan Review  evaluation/treatment results reviewed  -    Patient Effort  fair  -    Comment  The patient was seen bedside for an initial clinical swallow assessment. Patient was not wearing a face mask during this therapy encounter. Therapist used appropriate personal protective equipment including mask, eye protection and gloves.  Mask used was standard procedure  mask. Appropriate PPE was worn during the entire therapy session. Hand hygiene was completed before and after therapy session. Patient is not in enhanced droplet precautions.       -SM          Patient Profile Reviewed  yes  -SM    Pertinent History Of Current Problem  The patient is an 86-year-old female with dementia was brought in by EMS for altered mental status from [facility].  In the ED family was at bedside (son and ) and they reported she has been in the facility only 2 weeks.  She was found on the floor the morning of admission with a low blood pressure.  She was at her baseline mental status 2 days ago.  She has been restless and c/o knee pain.  Labs are unremarkable.  CT scan does not show any acute changes     MRI BRAIN WO CONTRAST-     Date of Exam: 8/4/2021 2:45 PM     Indication: Mental status change, unknown cause; R41.82-Altered mental  status, unspecified     Comparison: CT head without contrast 8/4/2021.     Technique: Multiplanar multisequence images of the brain were performed  without contrast according to routine brain MRI protocol.     FINDINGS:  10 mm restricted diffusion focus within the medial right occipital lobe  with ADC correlate, consistent with acute or subacute ischemia. No  hemorrhagic transformation is seen.     Large territorial encephalomalacia is demonstrated within the medial  right occipital lobe and posterior right temporal lobe, consistent with  remote ischemic insult. Extensive T2 and FLAIR signal intensity changes  are present in the deep white matter with chronic microvascular disease.  Chronic lacunar infarct is demonstrated within the central jonathan, and  within the right thalamus.      There is moderate atrophy with compensatory prominence of ventricles and  extra-axial spaces. Major vascular flow voids are thought to be  preserved.     There scattered punctate foci of susceptibility signal change  supratentorially and infratentorially which may reflect  sequelae of  remote bleed or changes related to fluid angiopathy.     Paranasal sinuses and mastoid air cells are clear. Calvarium is within  normal limits.     IMPRESSION:  1. 10 mm focal acute or subacute ischemic insult within the medial right  occipital lobe cortex without hemorrhagic transformation.  2. Old infarcts with large territory encephalomalacia within the right  central vascular flow.  3. Chronic lacunar infarcts in the central jonathan and right thalamus.  FINDINGS compatible with extensive chronic microvascular disease.  4. Moderate atrophy.  5. Scattered tiny foci of susceptibility signal change bilaterally may  reflect changes of a prevoid angiopathy or sequelae of old bleed.      -SM    Current Method of Nutrition  NPO  -SM    Precautions/Limitations, Vision  other (see comments) Difficult to assess. Patient kept eyes closed most of session  -SM    Precautions/Limitations, Hearing  WFL;for purposes of eval Patient did respond intermittently to verbalizations from clinician-SM    Prior Level of Function-Communication  unknown  -SM    Prior Level of Function-Swallowing  unknown  -SM    Plans/Goals Discussed with  other (see comments) Nurse  -SM          Oral Motor, Comment  Unable to complete full oral motor assessment at this time due to patient having decreased responses. PT did not follow commands to complete an oral mechanism examination  -          Respiratory Support Currently in Use  room air  -SM    Eating/Swallowing Skills  fed by SLP  -SM    Positioning During Eating  upright 90 degree;upright in bed  -    Utensils Used  spoon  -SM    Consistencies Trialed  ice chips  -          Clinical Swallow Evaluation Summary  The patient was seated up at approximately a a 90 degree angle in her bed for a clinical swallow assessment. Oral care completed with patient initially making no response, however as oral care continued she was noted to close lips on toothette and did attempt to orally  "manipulate. No observable swallow noted. Pt did respond to her to request for her name stating \"Michelle\". She remained minimally verbally responsive, however, throughout the remainder of the session and did not follow any other auditory commands. She was accepting of 2 trials of ice chips. Increased oral transit/manipulation noted. Excessive \"chewing\" evident. Mild anterior labial spillage noted. No observable swallow noted. Pt presented with increasing fatigue/lethargy and was not appropriate for additional trials or consistencies at this time.    RECOMMEND: ST unable to make appropriate or safe diet recommendations at this time due to decreased alertness/responses. Recommend that the patient remain NPO at this time, with ST to re-evaluate as appropriate. Discussed with nursing staff and they are in agreement with plan. Will continue to follow during her hospitalization  -          SLP Swallowing Diagnosis  mod-severe;oral dysphagia;R/O pharyngeal dysphagia  -    Functional Impact  risk of aspiration/pneumonia;risk of malnutrition;risk of dehydration  -    Rehab Potential/Prognosis, Swallowing  adequate, monitor progress closely  -    Swallow Criteria for Skilled Therapeutic Interventions Met  demonstrates skilled criteria  -          Therapy Frequency (Swallow)  4 days per week;5 days per week  -    Predicted Duration Therapy Intervention (Days)  until discharge  -    SLP Diet Recommendation  NPO;temporary alternate methods of nutrition/hydration  -    Recommended Diagnostics  reassess via clinical swallow evaluation  -    Oral Care Recommendations  Oral Care BID/PRN  -    SLP Rec. for Method of Medication Administration  meds via alternate route  -    Anticipated Discharge Disposition (SLP)  extended care facility  -          Oral Nutrition/Hydration Goal Selection (SLP)  oral nutrition/hydration, SLP goal 1;oral nutrition/hydration, SLP goal 2;oral nutrition/hydration, SLP goal (free text)  " -SM          Oral Nutrition/Hydration Goal 1, SLP  The patient will establish a po diet.   -SM    Time Frame (Oral Nutrition/Hydration Goal 1, SLP)  short term goal (STG)  -SM          Oral Nutrition/Hydration Goal 2, SLP  The patient will participate in ongoing assessment of swallow in preparation to resume a po diet and determine least restrictive/safest po diet.   -SM    Time Frame (Oral Nutrition/Hydration Goal 2, SLP)  short term goal (STG)  -SM          Oral Nutrition/Hydration Goal, SLP  Patient/caregiver teaching with additional goals to follow  -SM    Time Frame (Oral Nutrition/Hydration Goal, SLP)  short term goal (STG)  -SM      User Key  (r) = Recorded By, (t) = Taken By, (c) = Cosigned By    Initials Name Effective Dates    Nancy Smith SLP 06/16/21 -           EDUCATION  The patient has been educated in the following areas:   Dysphagia (Swallowing Impairment) Oral Care/Hydration NPO rationale.       SLP GOALS     Row Name 08/06/21 1100          Oral Nutrition/Hydration Goal 1, SLP  The patient will establish a po diet.   -SM    Time Frame (Oral Nutrition/Hydration Goal 1, SLP)  short term goal (STG)  -SM          Oral Nutrition/Hydration Goal 2, SLP  The patient will participate in ongoing assessment of swallow in preparation to resume a po diet and determine least restrictive/safest po diet.   -SM    Time Frame (Oral Nutrition/Hydration Goal 2, SLP)  short term goal (STG)  -SM          Oral Nutrition/Hydration Goal, SLP  Patient/caregiver teaching with additional goals to follow  -SM    Time Frame (Oral Nutrition/Hydration Goal, SLP)  short term goal (STG)  -SM      User Key  (r) = Recorded By, (t) = Taken By, (c) = Cosigned By    Initials Name Provider Type    Nancy Smith SLP Speech and Language Pathologist             Time Calculation:                CARRIE Mccormick  8/6/2021

## 2021-08-06 NOTE — PLAN OF CARE
Patient is resting, restraints off all shift, patient needs met, bed alarm is on., Patient is NPO now, will continue to monitor patient and meet needs. V/S stable.   Problem: Adult Inpatient Plan of Care  Goal: Absence of Hospital-Acquired Illness or Injury  Intervention: Identify and Manage Fall Risk  Recent Flowsheet Documentation  Taken 8/6/2021 0420 by Jennifer Valentino RN  Safety Promotion/Fall Prevention:   assistive device/personal items within reach   nonskid shoes/slippers when out of bed   room organization consistent   safety round/check completed   fall prevention program maintained   lighting adjusted   clutter free environment maintained  Taken 8/6/2021 0010 by Jennifer Valentino RN  Safety Promotion/Fall Prevention:   assistive device/personal items within reach   fall prevention program maintained   lighting adjusted   nonskid shoes/slippers when out of bed   room organization consistent   safety round/check completed   toileting scheduled  Intervention: Prevent Skin Injury  Recent Flowsheet Documentation  Taken 8/6/2021 0420 by Jennifer Valentino RN  Body Position: side-lying, left  Skin Protection:   adhesive use limited   tubing/devices free from skin contact  Taken 8/6/2021 0010 by Jennifer Valentino RN  Body Position:   side-lying, right   turned  Intervention: Prevent and Manage VTE (venous thromboembolism) Risk  Recent Flowsheet Documentation  Taken 8/6/2021 0420 by Jennifer Valentino RN  VTE Prevention/Management:   bilateral   sequential compression devices on  Intervention: Prevent Infection  Recent Flowsheet Documentation  Taken 8/6/2021 0420 by Jennifer Valentino RN  Infection Prevention:   single patient room provided   rest/sleep promoted   visitors restricted/screened  Taken 8/6/2021 0010 by Jennifer Valentino RN  Infection Prevention:   single patient room provided   rest/sleep promoted   hand hygiene promoted   equipment surfaces disinfected   visitors restricted/screened  Goal: Optimal  Comfort and Wellbeing  Intervention: Provide Person-Centered Care  Recent Flowsheet Documentation  Taken 8/6/2021 0420 by Jennifer Valentino RN  Trust Relationship/Rapport:   care explained   choices provided   thoughts/feelings acknowledged  Taken 8/6/2021 0010 by Jennifer Valentino RN  Trust Relationship/Rapport:   care explained   choices provided   questions encouraged   thoughts/feelings acknowledged     Problem: Fall Injury Risk  Goal: Absence of Fall and Fall-Related Injury  Intervention: Identify and Manage Contributors to Fall Injury Risk  Recent Flowsheet Documentation  Taken 8/6/2021 0420 by Jennifer Valentino RN  Medication Review/Management: medications reviewed  Self-Care Promotion:   independence encouraged   BADL personal objects within reach   BADL personal routines maintained  Taken 8/6/2021 0010 by Jennifer Valentino RN  Medication Review/Management: medications reviewed  Self-Care Promotion:   independence encouraged   BADL personal objects within reach   BADL personal routines maintained  Intervention: Promote Injury-Free Environment  Recent Flowsheet Documentation  Taken 8/6/2021 0420 by Jennifer Valentino RN  Safety Promotion/Fall Prevention:   assistive device/personal items within reach   nonskid shoes/slippers when out of bed   room organization consistent   safety round/check completed   fall prevention program maintained   lighting adjusted   clutter free environment maintained  Taken 8/6/2021 0010 by Jennifer Valentino RN  Safety Promotion/Fall Prevention:   assistive device/personal items within reach   fall prevention program maintained   lighting adjusted   nonskid shoes/slippers when out of bed   room organization consistent   safety round/check completed   toileting scheduled     Problem: Restraint, Nonbehavioral (Nonviolent)  Goal: Personal Dignity and Safety Maintained  Intervention: Protect Dignity, Rights, and Personal Wellbeing  Recent Flowsheet Documentation  Taken 8/6/2021 0420 by  Jennifer Valentino RN  Trust Relationship/Rapport:   care explained   choices provided   thoughts/feelings acknowledged  Taken 8/6/2021 0010 by Jennifer Valentino RN  Trust Relationship/Rapport:   care explained   choices provided   questions encouraged   thoughts/feelings acknowledged  Intervention: Protect Skin and Joint Integrity  Recent Flowsheet Documentation  Taken 8/6/2021 0420 by Jennifer Valentino RN  Body Position: side-lying, left  Taken 8/6/2021 0010 by Jennifer Valentino RN  Body Position:   side-lying, right   turned     Problem: Skin Injury Risk Increased  Goal: Skin Health and Integrity  Intervention: Optimize Skin Protection  Recent Flowsheet Documentation  Taken 8/6/2021 0420 by Jennifer Valentino RN  Pressure Reduction Techniques:   sit time limited to 2 hours   weight shift assistance provided  Head of Bed (HOB): [facility] elevated  Pressure Reduction Devices: pressure-redistributing mattress utilized  Skin Protection:   adhesive use limited   tubing/devices free from skin contact  Taken 8/6/2021 0010 by Jennifer Valentino RN  Pressure Reduction Techniques:   sit time limited to 2 hours   weight shift assistance provided  Head of Bed (HOB): [facility] elevated  Pressure Reduction Devices:   pressure-redistributing mattress utilized   positioning supports utilized   Goal Outcome Evaluation:

## 2021-08-06 NOTE — PROGRESS NOTES
LOS: 1 day   Patient Care Team:  Ahmet Lagos MD as PCP - General (Family Medicine)    Subjective     Interval History: currently NPO until speech eval    Patient Complaints: pt is non-verbal    History taken from: patient    Review of Systems        Objective     Vital Signs  Temp:  [97.4 °F (36.3 °C)-99.8 °F (37.7 °C)] 98.3 °F (36.8 °C)  Heart Rate:  [71-90] 80  Resp:  [18-27] 26  BP: (141-185)/(44-73) 151/72    Physical Exam:     General Appearance:    Alert, restless, trying to get out of bed, but will lay back when asked   Head:    Normocephalic, without obvious abnormality, atraumatic   Eyes:            Lids and lashes normal, conjunctivae and sclerae normal, no   icterus, no pallor, corneas clear, PERRLA   Ears:    Ears appear intact with no abnormalities noted   Throat:   No oral lesions, no thrush, oral mucosa moist   Neck:   No adenopathy, supple, trachea midline, no thyromegaly, no   carotid bruit, no JVD   Lungs:     Clear to auscultation,respirations regular, even and                  unlabored    Heart:    Regular rhythm and normal rate, normal S1 and S2, no            murmur, no gallop, no rub, no click   Chest Wall:    No abnormalities observed   Abdomen:     Normal bowel sounds, no masses, no organomegaly, soft        non-tender, non-distended, no guarding, no rebound                tenderness   Extremities:   Moves all extremities well, no edema, no cyanosis, no             redness   Pulses:   Pulses palpable and equal bilaterally   Skin:   No bleeding, bruising or rash   Lymph nodes:   No palpable adenopathy   Neurologic:   Cranial nerves 2 - 12 grossly intact, sensation intact, DTR       present and equal bilaterally        Results Review:    Lab Results (last 24 hours)     Procedure Component Value Units Date/Time    POC Glucose Once [[id number]]  (Normal) Collected: 08/06/21 1106    Specimen: Blood Updated: 08/06/21 1107     Glucose 82 mg/dL      Comment: Serial Number:  [id number]:  [id number]       POC Glucose Once [[id number]]  (Normal) Collected: 08/06/21 0609    Specimen: Blood Updated: 08/06/21 0610     Glucose 95 mg/dL      Comment: Serial Number: [id number]:  [id number]       Basic Metabolic Panel [[id number]]  (Normal) Collected: 08/06/21 0321    Specimen: Blood Updated: 08/06/21 0527     Glucose 90 mg/dL      BUN 12 mg/dL      Creatinine 0.58 mg/dL      Sodium 137 mmol/L      Potassium 3.9 mmol/L      Comment: Result checked         Chloride 104 mmol/L      CO2 22.0 mmol/L      Calcium 8.6 mg/dL      eGFR Non African Amer 99 mL/min/1.73      BUN/Creatinine Ratio 20.7     Anion Gap 11.0 mmol/L     Narrative:      GFR Normal >60  Chronic Kidney Disease <60  Kidney Failure <15      CBC (No Diff) [[id number]]  (Abnormal) Collected: 08/06/21 0321    Specimen: Blood Updated: 08/06/21 0503     WBC 10.00 10*3/mm3      RBC 4.30 10*6/mm3      Hemoglobin 14.4 g/dL      Hematocrit 40.8 %      MCV 94.9 fL      MCH 33.5 pg      MCHC 35.3 g/dL      RDW 13.7 %      RDW-SD 45.1 fl      MPV 8.6 fL      Platelets 248 10*3/mm3     POC Glucose Once [[id number]]  (Normal) Collected: 08/06/21 0043    Specimen: Blood Updated: 08/06/21 0044     Glucose 80 mg/dL      Comment: Serial Number: [id number]:  [id number]       POC Glucose Once [[id number]]  (Normal) Collected: 08/05/21 1607    Specimen: Blood Updated: 08/05/21 1609     Glucose 102 mg/dL      Comment: Serial Number: [id number]:  [id number]              Imaging Results (Last 24 Hours)     ** No results found for the last 24 hours. **               I reviewed the patient's new clinical results.    Medication Review:   Scheduled Meds:aspirin, 324 mg, Oral, Daily   Or  aspirin, 300 mg, Rectal, Daily  atorvastatin, 40 mg, Oral, Nightly  carboxymethylcellulose sod, 2 drop, Both Eyes, Daily  levETIRAcetam, 250 mg, Oral, Q12H  LORazepam, 1 mg, Oral, Q12H  risperiDONE, 0.5 mg, Oral, BID  senna, 1 tablet, Oral, Daily  sodium  chloride, 3 mL, Intravenous, Q12H      Continuous Infusions:sodium chloride, 75 mL/hr, Last Rate: 75 mL/hr (08/05/21 1528)      PRN Meds:.hydrALAZINE  •  LORazepam  •  melatonin  •  ondansetron  •  potassium chloride **OR** potassium chloride **OR** potassium chloride  •  sodium chloride  •  [COMPLETED] Insert peripheral IV **AND** sodium chloride  •  sodium chloride     Assessment/Plan       Altered mental status  - MRI of the brain shows small right occiptal infarct (not related to current sx), significant atrophy and evidence of significant chronic strokes, including a large posterior temporal lobe stroke.  Neuro following.  EEG planned.  Started keppra, ASA and lipitor.   Family is not interested in further investigation.       Dementia - continue home meds and ativan for restlessness prn.  Psych consulted. Started risperdal M-tabs     HTN - pt is not on any home meds.  Will add hydralazine prn     Choking - speech therapy referral placed.  NPO for now.  On IVF        Plan for disposition:back to [facility] when able    Nguyen Berry MD  08/06/21  11:25 EDT

## 2021-08-06 NOTE — PROGRESS NOTES
Referring Provider: Nguyen Berry MD  Reason for Consultation: Requesting advice/opinion on condition      Chief complaint Delirium and Agitation    Subjective .     History of present illness:  The patient is a 86 y.o. female who was admitted secondary to AMS likely due to an acute/subacute 10mm ischemic infarct seen on MRI. The patient is not responsive to verbal and physical stimulation upon evaluation. Nursing reports she has been asleep since Ativan and Risperdal was administered. Risperdal M-tabs were requested by nursing due to difficulty administering the regular tabs.    8/6: Patient has had little change in her current mental state. Nursing reports the patient remains NPO and is very sleepy with little responsiveness, which is consistent with the patient's presentation upon evaluation today. Nursing does report the patient has remained out of restraints since yesterday morning. The patient initially responded to very to verbal stimuli, but could not say her name, and could not respond after that point.    Review of Systems   Review of systems could not be obtained due to   patient unresponsive.    History  Past psychiatric history Unable to assess.  -Psychiatric Hospitalizations: Unable to assess.  -Suicide Attempts: Unable to assess.    Past Medical History:   Diagnosis Date   • Hypertension    • Stroke (CMS/[facility])         No family history on file.     Social History     Tobacco Use   • Smoking status: Never Smoker   • Smokeless tobacco: Never Used   Substance Use Topics   • Alcohol use: Yes     Alcohol/week: 14.0 standard drinks     Types: 14 Glasses of wine per week   • Drug use: No          Medications Prior to Admission   Medication Sig Dispense Refill Last Dose   • carboxymethylcellulose sod (Refresh Liquigel) 1 % gel eye gel Administer 2 drops to both eyes Daily.   8/3/2021 at 0932   • LORazepam (ATIVAN) 1 MG tablet Take 1 mg by mouth 2 (two) times a day.   8/3/2021 at 2017   • risperiDONE  "(risperDAL) 0.5 MG tablet Take 0.5 mg by mouth Daily.   8/3/2021 at 0932   • senna (senna) 8.6 MG tablet Take 1 tablet by mouth Daily.   8/3/2021 at 0932        Scheduled Meds:  aspirin, 324 mg, Oral, Daily   Or  aspirin, 300 mg, Rectal, Daily  carboxymethylcellulose sod, 2 drop, Both Eyes, Daily  levETIRAcetam, 250 mg, Intravenous, Q12H  risperiDONE, 0.5 mg, Oral, BID  sodium chloride, 3 mL, Intravenous, Q12H         Continuous Infusions:  sodium chloride, 75 mL/hr, Last Rate: 75 mL/hr (08/05/21 1528)        PRN Meds:  hydrALAZINE  •  LORazepam  •  ondansetron  •  potassium chloride **OR** potassium chloride **OR** potassium chloride  •  sodium chloride  •  [COMPLETED] Insert peripheral IV **AND** sodium chloride  •  sodium chloride      Allergies:  Patient has no known allergies.    Objective       Physical Exam:    Vital Signs:   /72 (BP Location: Right arm, Patient Position: Lying)   Pulse 80   Temp 98.3 °F (36.8 °C) (Oral)   Resp 26   Ht 165.1 cm (65\")   Wt 46 kg (101 lb 6.6 oz)   SpO2 99%   BMI 16.88 kg/m²     General Appearance:    Asleep with NAD.   Head:    Normocephalic, without obvious deformity, atraumatic.   Eyes:            Lids and lashes normal, conjunctivae and sclerae normal, no   icterus, no pallor, corneas clear.   Skin:  Neurologic:  Musculoskeletal:   No bleeding, bruising or rash.  Cranial nerves could not be assessed secondary to patient's condition.  Muscle tone: Hypotonic and atrophied. Strength unable to assess.  Abnormal Movements: No tremors or abnormal involuntary movements  Gait: Deferred, in bed     Mental Status Exam:   Orientation:  Unable to evaluate  Memory: Recent and remote memory Deficits  Mood/Affect: Unable to assess.  Suicidal Ideations: Unable to assess.  Homicidal Ideations:  Unable to assess.  Hallucinations: Unable to assess.  Delusions:  Unable to assess.  Obsessions: Unable to assess.  Behavior and Psychomotor Activity: Unable to assess.  Speech:  Unable " to assess.  Thought Process: Unable to assess.  Associations: Unable to assess.  Thought Content: Unable to assess.  Language: Unable to assess.  Concentration and computation: Unable to assess.  Attention Span: Unable to assess.  Fund of Knowledge: Unable to assess.  Reliability: Poor  Insight into mental health: Poor  Judgement: Poor  Impulse Control:  Unable to assess.  Hygiene: fair  Cooperation:  Unable to assess.  Eye Contact:  Closed    MSE from 8/5 was reviewed with no changes needed.  Medications and allergies reviewed.    Lab Results   Component Value Date    GLUCOSE 90 08/06/2021    CALCIUM 8.6 08/06/2021     08/06/2021    K 3.9 08/06/2021    CO2 22.0 08/06/2021     08/06/2021    BUN 12 08/06/2021    CREATININE 0.58 08/06/2021    EGFRIFNONA 99 08/06/2021    BCR 20.7 08/06/2021    ANIONGAP 11.0 08/06/2021       Last Urine Toxicity    There is no flowsheet data to display.         No results found for: PHENYTOIN, PHENOBARB, VALPROATE, CBMZ    Lab Results   Component Value Date     08/06/2021    BUN 12 08/06/2021    CREATININE 0.58 08/06/2021    TSH 3.860 08/05/2021    WBC 10.00 08/06/2021       Brief Urine Lab Results  (Last result in the past 365 days)      Color   Clarity   Blood   Leuk Est   Nitrite   Protein   CREAT   Urine HCG        08/04/21 0211 Yellow Clear Negative Negative Negative Negative               Assessment/Plan       Altered mental status       LABS: Reviewed.    Assessment:  Dementia with delirium from cerebral vascular disease.    Treatment Plan:  -Discontinue Risperdal M-tabs .5mg bid for restlessness and agitation since the patient is NPO and nursing has been unable to administer the patient's oral medications. The patient's QT pkbzhqex=973 and prolonged on 8/4, and trying to add an injectable antipsychotic at this point comes with too much risk for the benefit. The patient's Ativan also prevents scheduling of the low QT prolongation risk antipsychotic Zyprexa, and  further QT prolongation is still a possibility.  -Will continue to follow and monitor for changes.    Treatment Plan discussed with: Nursing      I discussed the patients findings and my recommendations with nursing staff    I have reviewed and approved the behavioral health treatment plans and problem list. Yes  Thank you for the consult  Referring MD has access to consult report and progress notes in EMR  Patient was examined wearing appropriate PPE.    Adal Gonzalez PA-C  08/06/21  17:36 EDT    EMR Dragon transcription disclaimer:  Some of this encounter note is an electronic transcription translation of spoken language to printed text. The electronic translation of spoken language may permit erroneous, or at times, nonsensical words or phrases to be inadvertently transcribed; Although I have reviewed the note for such errors some may still exist.

## 2021-08-07 PROBLEM — E43 SEVERE MALNUTRITION (HCC): Status: ACTIVE | Noted: 2021-01-01

## 2021-08-07 NOTE — SIGNIFICANT NOTE
Attempted to see this date for swallow re-evaluation to determine pt's readiness to initiate PO diet. Spoke w/ pt's RN who stated pt was not alert enough to participate at this time. Pt now w/ NGT. ST dept will continue to follow and complete re-evaluation as pt becomes appropriate w/ further recommendations as indicated.

## 2021-08-07 NOTE — PROGRESS NOTES
"DATE/TIME OF ADMISSION:  8/4/2021  1:44 AM     LOS: 2 days   Patient Care Team:  Ahmet Lagos MD as PCP - General (Family Medicine)  Consults       Date and Time Order Name Status Description    8/7/2021 12:20 AM Inpatient Palliative Care MD Consult      8/5/2021  6:39 AM Inpatient Psychiatrist Consult Completed     8/5/2021  1:23 AM Inpatient Psychiatrist Consult Completed     8/4/2021 12:30 PM Inpatient Neurology Consult General Completed     8/4/2021  3:34 AM Family Medicine Consult Completed             Subjective PAST HISTORY OF DEMENTIA AND STROKES. SMALL OCCIPITAL CVA SEEN ON ADMISSION MRI ALONG WITH PAST OLD STROKES  SHE WAS RECENTLY TAKEN FROM HER HOME WHERE SHE WAS NOT THRIVING UNDER THE CARE OF HER  AND HAS BEEN RESIDING AT [facility] X THE PAST 2 WEEKS.  HE SON RECENTLY GAINED GUARDIANSHIP TO BETTER PROVIDE PROPER CARE FOR HIS MOM. HE WISHES FOR HER TO BE KEPT COMFORTABLE, AND THAT SHE IS A DNR HERE IN THE HOSPITAL LIKE SHE WAS AT [facility]  I AGAIN VERIFIED HIS WISHES FOR HIS MOM THIS EVENING. HER RESPIRATIONS HAVE GRADUALLY INCREASED OVER THE COURSE OF THE DAY. THE TUBE FEEDINGS WERE BEGUN AND NO RESIDUALS WERE NOTED BY THE RN AT THE BEDSIDE WHEN I ROUNDED THIS AFTERNOON.   WAS AT THE BEDSIDE WHEN I SAW HER IN ROUNDING.      Interval History: TSAI DECLINE IN STATUS  MULTIPLE STROKES, ONE MORE SUBACUTE IN THE OCCIPUT. NON RESPONSIVE. EEG REVEALED SEIZURES AND SHE HAD BEEN STARTED ON KEPPRA  BY DR MORGAN      Patient Complaints: NONE; SHE DOES NOT RESPOND FOR ME TODAY    History taken from: patient, , AND SON    Review of Systems        Objective     Vital Signs  BP (!) 183/77 (BP Location: Left arm, Patient Position: Lying)   Pulse 103   Temp 98.1 °F (36.7 °C) (Oral)   Resp (!) 30   Ht 165.1 cm (65\")   Wt 46.6 kg (102 lb 11.8 oz)   SpO2 100%   BMI 17.10 kg/m²     Physical Exam:           General Appearance:    NON RESPONSIVE; MODERATE RESPIRATORY  " distress   Head:    Normocephalic, without obvious abnormality, atraumatic   Eyes:            EYES CLOSED   Ears:      Throat:   NG TUBE IN PLACE   Neck:      Lungs:     Clear to auscultation,respirations regular BUT SHALLOW AND IN THE 40'S    Heart:    Regular rhythm and normal rate, normal S1 and S2, no            murmur, no gallop, no rub, no click   Chest Wall:    No abnormalities observed   Abdomen:     Normal bowel sounds; SOFT   Extremities:   NO EDEMA; NO CALF TENDERNESS   Pulses:   Pulses palpable and equal bilaterally   Skin:   No bleeding, bruising or rash   Lymph nodes:   No palpable adenopathy   Neurologic:   NON RESPONSIVE        I HAVE PERSONALLY EXAMINED AND REVIEWED THE PATIENT'S RECORD     Lab Results (last 48 hours)       Procedure Component Value Units Date/Time    POC Glucose Once [[id number]]  (Normal) Collected: 08/06/21 2351    Specimen: Blood Updated: 08/06/21 2352     Glucose 89 mg/dL      Comment: Serial Number: [id number]:  [id number]       POC Glucose Once [[id number]]  (Normal) Collected: 08/06/21 1623    Specimen: Blood Updated: 08/06/21 1626     Glucose 89 mg/dL      Comment: Serial Number: [id number]:  [id number]       POC Glucose Once [[id number]]  (Normal) Collected: 08/06/21 1106    Specimen: Blood Updated: 08/06/21 1107     Glucose 82 mg/dL      Comment: Serial Number: [id number]:  [id number]       POC Glucose Once [[id number]]  (Normal) Collected: 08/06/21 0609    Specimen: Blood Updated: 08/06/21 0610     Glucose 95 mg/dL      Comment: Serial Number: [id number]:  [id number]       Basic Metabolic Panel [[id number]]  (Normal) Collected: 08/06/21 0321    Specimen: Blood Updated: 08/06/21 0527     Glucose 90 mg/dL      BUN 12 mg/dL      Creatinine 0.58 mg/dL      Sodium 137 mmol/L      Potassium 3.9 mmol/L      Comment: Result checked         Chloride 104 mmol/L      CO2 22.0 mmol/L      Calcium 8.6 mg/dL      eGFR Non African Amer 99 mL/min/1.73       BUN/Creatinine Ratio 20.7     Anion Gap 11.0 mmol/L     Narrative:      GFR Normal >60  Chronic Kidney Disease <60  Kidney Failure <15      CBC (No Diff) [[id number]]  (Abnormal) Collected: 08/06/21 0321    Specimen: Blood Updated: 08/06/21 0503     WBC 10.00 10*3/mm3      RBC 4.30 10*6/mm3      Hemoglobin 14.4 g/dL      Hematocrit 40.8 %      MCV 94.9 fL      MCH 33.5 pg      MCHC 35.3 g/dL      RDW 13.7 %      RDW-SD 45.1 fl      MPV 8.6 fL      Platelets 248 10*3/mm3     POC Glucose Once [[id number]]  (Normal) Collected: 08/06/21 0043    Specimen: Blood Updated: 08/06/21 0044     Glucose 80 mg/dL      Comment: Serial Number: [id number]:  [id number]       POC Glucose Once [[id number]]  (Normal) Collected: 08/05/21 1607    Specimen: Blood Updated: 08/05/21 1609     Glucose 102 mg/dL      Comment: Serial Number: [id number]:  [id number]                Imaging Results (Last 48 Hours)       Procedure Component Value Units Date/Time    XR Abdomen KUB [[id number]] Collected: 08/07/21 0919     Updated: 08/07/21 0922    Narrative:      DATE OF EXAM:  8/7/2021 9:16 AM     PROCEDURE:  XR ABDOMEN KUB-     INDICATIONS:  NG tube placement; R41.82-Altered mental status, unspecified evaluate  location nasogastric tube in the abdomen     COMPARISON:  Single plain film images the abdomen performed on August 6, 2021     TECHNIQUE:   Single radiographic view of the abdomen was obtained.        FINDINGS:  Single AP supine portable plain film images the abdomen reveals a  nasogastric course the esophagus and stomach below diaphragm. Abdominal  bowel gas pattern is normal. The lung bases are normal        Impression:         1. Nasogastric course the esophagus and stomach below diaphragm.     Electronically Signed By-Jeff Elmore MD On:8/7/2021 9:20 AM  This report was finalized on [id number] by  Jeff Elmore MD.    XR Abdomen KUB [[id number]] Collected: 08/06/21 2048     Updated: 08/06/21 2052    Narrative:       DATE OF EXAM:  8/6/2021 8:45 PM     PROCEDURE:  XR ABDOMEN KUB-     INDICATIONS:  dobhoff placement; R41.82-Altered mental status, unspecified     COMPARISON:  None available     TECHNIQUE:   Single radiographic view of the abdomen was obtained.     FINDINGS:  There is a feeding tube which courses below the diaphragm and terminates  at the expected location the proximal stomach. The lung bases are clear.          Impression:      Feeding tube terminates below the diaphragm at the expected location of  the proximal stomach.     Electronically Signed By-Chaka Mosqueda MD On:8/6/2021 8:49 PM  This report was finalized on [id number] by  Chaka Mosqueda MD.                 I reviewed the patient's new clinical results.    Past Medical History:   Diagnosis Date    Hypertension     Stroke (CMS/HCC)      Past Surgical History:   Procedure Laterality Date    HYSTERECTOMY           Medication Review:   Scheduled Meds:aspirin, 324 mg, Oral, Daily   Or  aspirin, 300 mg, Rectal, Daily  carboxymethylcellulose sod, 2 drop, Both Eyes, Daily  cloNIDine, 0.1 mg, Nasogastric, Q12H  levETIRAcetam, 250 mg, Intravenous, Q12H  risperiDONE, 0.5 mg, Nasogastric, Q12H  sodium chloride, 3 mL, Intravenous, Q12H      Continuous Infusions:sodium chloride, 75 mL/hr, Last Rate: 75 mL/hr (08/05/21 1528)      PRN Meds:.hydrALAZINE    LORazepam    ondansetron    potassium chloride **OR** potassium chloride **OR** potassium chloride    sodium chloride    [COMPLETED] Insert peripheral IV **AND** sodium chloride    sodium chloride     Assessment/Plan   MODERATE RESPIRATORY DISTRESS---SUPPORTIVE CARE WITH O2; WILL CHECK A CXR; COMFORT MEASURES ONLY  DEMENTIA---DNR  MULTI STROKE STATUS  ABNORMAL EEG; ENCEPHALOPATHY AND SEIZURES---KEPPRA  S/P HYST  SEVERE MALNUTRITION ASSOCIATED WITH ADVANCED AGE, DEMENTIA, AND CHRONIC DISEASE---NO PEG DESIRED BY POA (SON LOLA) AND TUBE FEEDINGS WILL BE STOPPED AS COMFORT MEASURES ONLY WILL BE GIVEN  Active  Problems:    Altered mental status          Plan for disposition:COMFORT MEASURES; [facility] REQUESTED (SHE HAS BEEN A PRIOR PATIENT)   MOST LIKELY WILL STAY AT [facility], BUT COULD RETURN TO [facility] ALSO---I DO NOT BELIEVE THAT [facility] CAN FOLLOW HER THERE, AND UNDER THE CIRCUMSTANCES, I PREFER THAT SHE STAY AT [facility] WITH [facility]  I SPOKE WITH THE SON, LOLA, IN DETAIL    Florecita Chavez MD  08/07/21  10:07 EDT

## 2021-08-07 NOTE — PLAN OF CARE
Goal Outcome Evaluation:  Problem: Adult Inpatient Plan of Care  Goal: Plan of Care Review  8/7/2021 0320 by Guerline Valenzuela RN  Outcome: Ongoing, Progressing  Flowsheets (Taken 8/6/2021 1025 by Reyes, Carmela, PT)  Plan of Care Reviewed With: patient  8/7/2021 0320 by Guerline Valenzuela RN  Outcome: Ongoing, Progressing  Goal: Patient-Specific Goal (Individualized)  8/7/2021 0320 by Guerline Valenzuela RN  Outcome: Ongoing, Progressing  8/7/2021 0320 by Guerline Valenzuela RN  Outcome: Ongoing, Progressing  Goal: Absence of Hospital-Acquired Illness or Injury  8/7/2021 0320 by Guerline Valenzuela RN  Outcome: Ongoing, Progressing  8/7/2021 0320 by Guerline Valenzuela RN  Outcome: Ongoing, Progressing  Goal: Optimal Comfort and Wellbeing  8/7/2021 0320 by Guerline Valenzuela RN  Outcome: Ongoing, Progressing  8/7/2021 0320 by Guerline Valenzuela RN  Outcome: Ongoing, Progressing  Goal: Readiness for Transition of Care  8/7/2021 0320 by Guerline Valenzuela RN  Outcome: Ongoing, Progressing  8/7/2021 0320 by Guerline Valenzuela RN  Outcome: Ongoing, Progressing     Problem: Fall Injury Risk  Goal: Absence of Fall and Fall-Related Injury  8/7/2021 0320 by Guerline Valenzuela RN  Outcome: Ongoing, Progressing  8/7/2021 0320 by Guerline Valenzuela RN  Outcome: Ongoing, Progressing     Problem: Skin Injury Risk Increased  Goal: Skin Health and Integrity  8/7/2021 0320 by Guerline Valenzuela RN  Outcome: Ongoing, Progressing  8/7/2021 0320 by Guerline Valenzuela RN  Outcome: Ongoing, Progressing     Problem: Malnutrition  Goal: Improved Nutritional Intake  8/7/2021 0320 by Guerline Valenzuela RN  Outcome: Ongoing, Progressing  8/7/2021 0320 by Guerline Valenzuela RN  Outcome: Ongoing, Progressing

## 2021-08-07 NOTE — CONSULTS
Nutrition Services    Patient Name: Doris Brenner  YOB: 1935  MRN: [id number]  Admission date: 8/4/2021    PPE Documentation        PPE Worn By Provider Did not enter room on this encounter    PPE Worn By Patient  -      PROGRESS NOTE      Encounter Information: Progress note to monitor goals of care.       PO Diet: NPO Diet   PO Supplements: -    PO Intake:  -       Nutrition support orders: None currently    Nutrition support review: Isosource HN documented infusing at 25 mL/hr, Spoke with RN this AM who reports this is true. NGT placed. Requested TF consult to put in appropriate TF orders. TF consult was received afterwards.        Labs (reviewed below): Reviewed         GI Function:  Residuals WNL  No BM documented yet this admission, if does not have BM today will make day #3.        Nutrition Intervention: Recommend to keep TF at 25 mL/hr today given severe malnutrition is at high risk for refeeding syndrome.     EN prescription: Isosource HN at 25 mL/hr + 10 mL/hr water flush        Results from last 7 days   Lab Units 08/06/21  0321 08/05/21  0346 08/04/21  0154   SODIUM mmol/L 137 141 137   POTASSIUM mmol/L 3.9 3.1* 3.6   CHLORIDE mmol/L 104 102 101   CO2 mmol/L 22.0 27.0 26.0   BUN mg/dL 12 11 15   CREATININE mg/dL 0.58 0.60 0.73   CALCIUM mg/dL 8.6 9.0 8.8   BILIRUBIN mg/dL  --   --  0.8   ALK PHOS U/L  --   --  51   ALT (SGPT) U/L  --   --  17   AST (SGOT) U/L  --   --  49*   GLUCOSE mg/dL 90 100* 109*     Results from last 7 days   Lab Units 08/06/21  0321 08/05/21  0346 08/05/21  0346   MAGNESIUM mg/dL  --   --  1.8   HEMOGLOBIN g/dL 14.4   < > 15.1   HEMATOCRIT % 40.8   < > 42.8   TRIGLYCERIDES mg/dL  --   --  122    < > = values in this interval not displayed.     COVID19   Date Value Ref Range Status   08/04/2021 Not Detected Not Detected - Ref. Range Final     Lab Results   Component Value Date    HGBA1C 5.2 08/05/2021       RD to follow up per protocol.    Electronically signed  by:  Chante Anderson RD  08/07/21 12:01 EDT

## 2021-08-07 NOTE — NURSING NOTE
RN spoke to Dr. Foster at this time in regards to patient's nutrition status.  (See orders)  RN also spoke with MD in regards to patient's elevated BP.  MD gave orders for clonidine and to continue to monitor BP.  If BP remains high, RN to give another dose of clonidine.

## 2021-08-07 NOTE — PLAN OF CARE
Goal Outcome Evaluation:              Problem: Adult Inpatient Plan of Care  Goal: Absence of Hospital-Acquired Illness or Injury  Intervention: Identify and Manage Fall Risk  Recent Flowsheet Documentation  Taken 8/7/2021 1800 by [place], Doris, RN  Safety Promotion/Fall Prevention:   safety round/check completed   room organization consistent   nonskid shoes/slippers when out of bed   fall prevention program maintained   clutter free environment maintained   assistive device/personal items within reach   activity supervised  Taken 8/7/2021 1600 by Angus, Doris, RN  Safety Promotion/Fall Prevention:   safety round/check completed   room organization consistent   nonskid shoes/slippers when out of bed   fall prevention program maintained   clutter free environment maintained   assistive device/personal items within reach   activity supervised  Taken 8/7/2021 1400 by [place], Doris, RN  Safety Promotion/Fall Prevention:   safety round/check completed   room organization consistent   nonskid shoes/slippers when out of bed   fall prevention program maintained   clutter free environment maintained   assistive device/personal items within reach   activity supervised  Taken 8/7/2021 1000 by Angus, Doris, RN  Safety Promotion/Fall Prevention:   safety round/check completed   room organization consistent   nonskid shoes/slippers when out of bed   fall prevention program maintained   clutter free environment maintained   assistive device/personal items within reach   activity supervised  Taken 8/7/2021 0800 by Doris Greco, RN  Safety Promotion/Fall Prevention:   safety round/check completed   room organization consistent   nonskid shoes/slippers when out of bed   fall prevention program maintained   clutter free environment maintained   assistive device/personal items within reach   activity supervised     Problem: Adult Inpatient Plan of Care  Goal: Absence of Hospital-Acquired Illness or  Injury  Intervention: Prevent Skin Injury  Recent Flowsheet Documentation  Taken 8/7/2021 0800 by Doris Greco, RN  Skin Protection:   adhesive use limited   incontinence pads utilized

## 2021-08-08 NOTE — NURSING NOTE
Care for patient has been assumedby Hospice. Family and Hospice Nursae have been discussing current plans of care and future of progression of disease process.

## 2021-08-08 NOTE — NURSING NOTE
Pt started to have problems keeping oxygen saturations above 90%the patient is less responsive, family aware of the dieing process and encouraged to ask quetsions

## 2021-08-09 NOTE — CASE MANAGEMENT/SOCIAL WORK
Case Management Discharge Note                Selected Continued Care - Discharged on 2021 Admission date: 2021 - Discharge disposition:                      Final Discharge Disposition Code: 41 -  in medical facility

## 2021-08-21 NOTE — DISCHARGE SUMMARY
Date of Death:  2021  Cause of Death: RESPIRATORY ARREST WITH ADVANCED DEMENTIA AND PRECIPITOUS DECLINE IN THE PAST 2 WEEKS IN THE NH  Final Diagnosis: MODERATE RESPIRATORY DISTRESS---SUPPORTIVE CARE WITH O2; WILL CHECK A CXR; COMFORT MEASURES ONLY; SON AT THE BEDSIDE AND RESPIRATORY ARREST ENSUED  DEMENTIA---DNR  MULTI STROKE STATUS  ABNORMAL EEG; ENCEPHALOPATHY AND SEIZURES---KEPPRA  S/P HYST  SEVERE MALNUTRITION ASSOCIATED WITH ADVANCED AGE, DEMENTIA, AND CHRONIC DISEASE---NO PEG DESIRED BY POA (SON LOLA) AND TUBE FEEDINGS WILL BE STOPPED AS COMFORT MEASURES ONLY WAS GIVEN    Presenting Problem/History of Present Illness  Active Hospital Problems    Diagnosis  POA   • Severe malnutrition (CMS/HCC) [E43]  Yes   • Altered mental status [R41.82]  Yes      Resolved Hospital Problems   No resolved problems to display.         Hospital Course  Patient was a 86 y.o. female presented with RAPID DECLINE IN HER STATUS. NOT EATING, INCREASINGLY CONFUSED. SENT IN FROM THE NURSING HOME WHERE SHE HAS RESIDED FOR THE PAST 2 WEEKS FOR DECREASED RESPONSIVNESS.  SON AS LEGAL GUARDIAN ELECTED FOR DNR AND COMFORT CARE. I CALLED HIM IN AS DEATH  SEEMED CLOSE, TO BE AT HER SIDE. SHE  WITHIN A FEW HOURS OF MY SEEING HER ON ROUNDS. SHE  COMFORTABLY    Procedures Performed         Consults:   Consults     Date and Time Order Name Status Description    2021  6:39 AM Inpatient Psychiatrist Consult Completed     2021  1:23 AM Inpatient Psychiatrist Consult Completed     2021 12:30 PM Inpatient Neurology Consult General Completed     2021  3:34 AM Family Medicine Consult Completed               Florecita Chavez MD  21  14:03 EDT